# Patient Record
Sex: MALE | Race: OTHER | NOT HISPANIC OR LATINO
[De-identification: names, ages, dates, MRNs, and addresses within clinical notes are randomized per-mention and may not be internally consistent; named-entity substitution may affect disease eponyms.]

---

## 2019-05-06 PROBLEM — Z00.00 ENCOUNTER FOR PREVENTIVE HEALTH EXAMINATION: Status: ACTIVE | Noted: 2019-05-06

## 2019-05-07 ENCOUNTER — APPOINTMENT (OUTPATIENT)
Dept: OTOLARYNGOLOGY | Facility: CLINIC | Age: 39
End: 2019-05-07
Payer: COMMERCIAL

## 2019-05-07 VITALS
BODY MASS INDEX: 24.38 KG/M2 | HEART RATE: 87 BPM | SYSTOLIC BLOOD PRESSURE: 112 MMHG | WEIGHT: 190 LBS | HEIGHT: 74 IN | DIASTOLIC BLOOD PRESSURE: 78 MMHG

## 2019-05-07 DIAGNOSIS — Z86.39 PERSONAL HISTORY OF OTHER ENDOCRINE, NUTRITIONAL AND METABOLIC DISEASE: ICD-10-CM

## 2019-05-07 DIAGNOSIS — H93.292 OTHER ABNORMAL AUDITORY PERCEPTIONS, LEFT EAR: ICD-10-CM

## 2019-05-07 DIAGNOSIS — Z78.9 OTHER SPECIFIED HEALTH STATUS: ICD-10-CM

## 2019-05-07 DIAGNOSIS — H61.20 IMPACTED CERUMEN, UNSPECIFIED EAR: ICD-10-CM

## 2019-05-07 PROCEDURE — 69210 REMOVE IMPACTED EAR WAX UNI: CPT

## 2019-05-07 PROCEDURE — 99203 OFFICE O/P NEW LOW 30 MIN: CPT | Mod: 25

## 2019-05-07 RX ORDER — METFORMIN HYDROCHLORIDE 625 MG/1
TABLET ORAL
Refills: 0 | Status: ACTIVE | COMMUNITY

## 2019-05-07 NOTE — PHYSICAL EXAM
[FreeTextEntry1] : Microscopic ear exam with cerumen debridement:\par \par Right ear: Obstructing cerumen was debrided from the ear canal using suction, and curet.  The ear canal was otherwise within normal limits.  The tympanic membrane was intact and noninflamed.\par \par Left ear: Obstructing cerumen was debrided from the ear canal using suction, and curets.  The ear canal was otherwise within normal limits.  The tympanic membrane was intact and noninflamed.\par  [Midline] : trachea located in midline position [Normal] : no rashes

## 2019-05-07 NOTE — HISTORY OF PRESENT ILLNESS
[de-identified] : JUWAN ALEGRE has a history of intermittent left ear fullness and pain for the past several weeks.\par \par No prior history of infectious ear disease.\par No history of tinnitus.\par No history of vertigo.\par No history of exposure to loud noise or music.

## 2019-05-07 NOTE — ASSESSMENT
[FreeTextEntry1] : Ear hygiene reviewed in detail.  Follow up recommended if symptoms persist or progresses.  Routine follow up for cerumen management suggested.

## 2023-12-21 ENCOUNTER — TRANSCRIPTION ENCOUNTER (OUTPATIENT)
Age: 43
End: 2023-12-21

## 2023-12-22 ENCOUNTER — INPATIENT (INPATIENT)
Facility: HOSPITAL | Age: 43
LOS: 2 days | Discharge: HOME | DRG: 853 | End: 2023-12-25
Attending: SURGERY | Admitting: SURGERY
Payer: COMMERCIAL

## 2023-12-22 ENCOUNTER — TRANSCRIPTION ENCOUNTER (OUTPATIENT)
Age: 43
End: 2023-12-22

## 2023-12-22 VITALS
DIASTOLIC BLOOD PRESSURE: 92 MMHG | RESPIRATION RATE: 16 BRPM | HEIGHT: 74 IN | OXYGEN SATURATION: 100 % | SYSTOLIC BLOOD PRESSURE: 144 MMHG | HEART RATE: 122 BPM | TEMPERATURE: 98 F | WEIGHT: 190.04 LBS

## 2023-12-22 LAB
ALBUMIN SERPL ELPH-MCNC: 4.6 G/DL — SIGNIFICANT CHANGE UP (ref 3.3–5)
ALBUMIN SERPL ELPH-MCNC: 4.6 G/DL — SIGNIFICANT CHANGE UP (ref 3.3–5)
ALP SERPL-CCNC: 66 U/L — SIGNIFICANT CHANGE UP (ref 40–120)
ALP SERPL-CCNC: 66 U/L — SIGNIFICANT CHANGE UP (ref 40–120)
ALT FLD-CCNC: 11 U/L — SIGNIFICANT CHANGE UP (ref 10–45)
ALT FLD-CCNC: 11 U/L — SIGNIFICANT CHANGE UP (ref 10–45)
ANION GAP SERPL CALC-SCNC: 15 MMOL/L — SIGNIFICANT CHANGE UP (ref 5–17)
ANION GAP SERPL CALC-SCNC: 15 MMOL/L — SIGNIFICANT CHANGE UP (ref 5–17)
APPEARANCE UR: CLEAR — SIGNIFICANT CHANGE UP
APPEARANCE UR: CLEAR — SIGNIFICANT CHANGE UP
APTT BLD: 29.5 SEC — SIGNIFICANT CHANGE UP (ref 24.5–35.6)
APTT BLD: 29.5 SEC — SIGNIFICANT CHANGE UP (ref 24.5–35.6)
AST SERPL-CCNC: 14 U/L — SIGNIFICANT CHANGE UP (ref 10–40)
AST SERPL-CCNC: 14 U/L — SIGNIFICANT CHANGE UP (ref 10–40)
B-OH-BUTYR SERPL-SCNC: 2.7 MMOL/L — HIGH
B-OH-BUTYR SERPL-SCNC: 2.7 MMOL/L — HIGH
BASE EXCESS BLDA CALC-SCNC: -4.4 MMOL/L — LOW (ref -2–3)
BASE EXCESS BLDA CALC-SCNC: -4.4 MMOL/L — LOW (ref -2–3)
BASE EXCESS BLDA CALC-SCNC: -5.4 MMOL/L — LOW (ref -2–3)
BASE EXCESS BLDA CALC-SCNC: -5.4 MMOL/L — LOW (ref -2–3)
BASE EXCESS BLDA CALC-SCNC: -6.1 MMOL/L — LOW (ref -2–3)
BASE EXCESS BLDA CALC-SCNC: -6.1 MMOL/L — LOW (ref -2–3)
BILIRUB DIRECT SERPL-MCNC: 0.3 MG/DL — SIGNIFICANT CHANGE UP (ref 0–0.3)
BILIRUB DIRECT SERPL-MCNC: 0.3 MG/DL — SIGNIFICANT CHANGE UP (ref 0–0.3)
BILIRUB INDIRECT FLD-MCNC: 2 MG/DL — HIGH (ref 0.2–1)
BILIRUB INDIRECT FLD-MCNC: 2 MG/DL — HIGH (ref 0.2–1)
BILIRUB SERPL-MCNC: 2.3 MG/DL — HIGH (ref 0.2–1.2)
BILIRUB SERPL-MCNC: 2.3 MG/DL — HIGH (ref 0.2–1.2)
BILIRUB UR-MCNC: NEGATIVE — SIGNIFICANT CHANGE UP
BILIRUB UR-MCNC: NEGATIVE — SIGNIFICANT CHANGE UP
BLD GP AB SCN SERPL QL: NEGATIVE — SIGNIFICANT CHANGE UP
BLD GP AB SCN SERPL QL: NEGATIVE — SIGNIFICANT CHANGE UP
BUN SERPL-MCNC: 19 MG/DL — SIGNIFICANT CHANGE UP (ref 7–23)
BUN SERPL-MCNC: 19 MG/DL — SIGNIFICANT CHANGE UP (ref 7–23)
CA-I BLDA-SCNC: 1.12 MMOL/L — LOW (ref 1.15–1.33)
CA-I BLDA-SCNC: 1.12 MMOL/L — LOW (ref 1.15–1.33)
CA-I BLDA-SCNC: 1.14 MMOL/L — LOW (ref 1.15–1.33)
CA-I BLDA-SCNC: 1.14 MMOL/L — LOW (ref 1.15–1.33)
CALCIUM SERPL-MCNC: 10.1 MG/DL — SIGNIFICANT CHANGE UP (ref 8.4–10.5)
CALCIUM SERPL-MCNC: 10.1 MG/DL — SIGNIFICANT CHANGE UP (ref 8.4–10.5)
CHLORIDE SERPL-SCNC: 96 MMOL/L — SIGNIFICANT CHANGE UP (ref 96–108)
CHLORIDE SERPL-SCNC: 96 MMOL/L — SIGNIFICANT CHANGE UP (ref 96–108)
CO2 BLDA-SCNC: 20 MMOL/L — SIGNIFICANT CHANGE UP (ref 19–24)
CO2 BLDA-SCNC: 20 MMOL/L — SIGNIFICANT CHANGE UP (ref 19–24)
CO2 BLDA-SCNC: 21 MMOL/L — SIGNIFICANT CHANGE UP (ref 19–24)
CO2 BLDA-SCNC: 21 MMOL/L — SIGNIFICANT CHANGE UP (ref 19–24)
CO2 BLDA-SCNC: 22 MMOL/L — SIGNIFICANT CHANGE UP (ref 19–24)
CO2 BLDA-SCNC: 22 MMOL/L — SIGNIFICANT CHANGE UP (ref 19–24)
CO2 SERPL-SCNC: 23 MMOL/L — SIGNIFICANT CHANGE UP (ref 22–31)
CO2 SERPL-SCNC: 23 MMOL/L — SIGNIFICANT CHANGE UP (ref 22–31)
COHGB MFR BLDA: 1.3 % — SIGNIFICANT CHANGE UP
COHGB MFR BLDA: 1.3 % — SIGNIFICANT CHANGE UP
COHGB MFR BLDA: 1.7 % — SIGNIFICANT CHANGE UP
COHGB MFR BLDA: 1.7 % — SIGNIFICANT CHANGE UP
COLOR SPEC: YELLOW — SIGNIFICANT CHANGE UP
COLOR SPEC: YELLOW — SIGNIFICANT CHANGE UP
CREAT SERPL-MCNC: 0.93 MG/DL — SIGNIFICANT CHANGE UP (ref 0.5–1.3)
CREAT SERPL-MCNC: 0.93 MG/DL — SIGNIFICANT CHANGE UP (ref 0.5–1.3)
DIFF PNL FLD: ABNORMAL
DIFF PNL FLD: ABNORMAL
EGFR: 106 ML/MIN/1.73M2 — SIGNIFICANT CHANGE UP
EGFR: 106 ML/MIN/1.73M2 — SIGNIFICANT CHANGE UP
GAS PNL BLDA: SIGNIFICANT CHANGE UP
GLUCOSE BLDA-MCNC: 126 MG/DL — HIGH (ref 70–99)
GLUCOSE BLDA-MCNC: 126 MG/DL — HIGH (ref 70–99)
GLUCOSE BLDA-MCNC: 130 MG/DL — HIGH (ref 70–99)
GLUCOSE BLDA-MCNC: 130 MG/DL — HIGH (ref 70–99)
GLUCOSE BLDC GLUCOMTR-MCNC: 129 MG/DL — HIGH (ref 70–99)
GLUCOSE BLDC GLUCOMTR-MCNC: 129 MG/DL — HIGH (ref 70–99)
GLUCOSE BLDC GLUCOMTR-MCNC: 139 MG/DL — HIGH (ref 70–99)
GLUCOSE BLDC GLUCOMTR-MCNC: 139 MG/DL — HIGH (ref 70–99)
GLUCOSE SERPL-MCNC: 150 MG/DL — HIGH (ref 70–99)
GLUCOSE SERPL-MCNC: 150 MG/DL — HIGH (ref 70–99)
GLUCOSE UR QL: >=1000 MG/DL
GLUCOSE UR QL: >=1000 MG/DL
HCO3 BLDA-SCNC: 19 MMOL/L — LOW (ref 21–28)
HCO3 BLDA-SCNC: 19 MMOL/L — LOW (ref 21–28)
HCO3 BLDA-SCNC: 20 MMOL/L — LOW (ref 21–28)
HCO3 BLDA-SCNC: 20 MMOL/L — LOW (ref 21–28)
HCO3 BLDA-SCNC: 21 MMOL/L — SIGNIFICANT CHANGE UP (ref 21–28)
HCO3 BLDA-SCNC: 21 MMOL/L — SIGNIFICANT CHANGE UP (ref 21–28)
HCT VFR BLD CALC: 38.9 % — LOW (ref 39–50)
HCT VFR BLD CALC: 38.9 % — LOW (ref 39–50)
HCT VFR BLD CALC: 46 % — SIGNIFICANT CHANGE UP (ref 39–50)
HCT VFR BLD CALC: 46 % — SIGNIFICANT CHANGE UP (ref 39–50)
HGB BLD-MCNC: 12.9 G/DL — LOW (ref 13–17)
HGB BLD-MCNC: 12.9 G/DL — LOW (ref 13–17)
HGB BLD-MCNC: 15.4 G/DL — SIGNIFICANT CHANGE UP (ref 13–17)
HGB BLD-MCNC: 15.4 G/DL — SIGNIFICANT CHANGE UP (ref 13–17)
HGB BLDA-MCNC: 12.2 G/DL — LOW (ref 12.6–17.4)
HGB BLDA-MCNC: 12.2 G/DL — LOW (ref 12.6–17.4)
HGB BLDA-MCNC: 12.6 G/DL — SIGNIFICANT CHANGE UP (ref 12.6–17.4)
HGB BLDA-MCNC: 12.6 G/DL — SIGNIFICANT CHANGE UP (ref 12.6–17.4)
INR BLD: 1.27 — HIGH (ref 0.85–1.18)
INR BLD: 1.27 — HIGH (ref 0.85–1.18)
KETONES UR-MCNC: 80 MG/DL
KETONES UR-MCNC: 80 MG/DL
LACTATE SERPL-SCNC: 1.2 MMOL/L — SIGNIFICANT CHANGE UP (ref 0.5–2)
LACTATE SERPL-SCNC: 1.2 MMOL/L — SIGNIFICANT CHANGE UP (ref 0.5–2)
LEUKOCYTE ESTERASE UR-ACNC: NEGATIVE — SIGNIFICANT CHANGE UP
LEUKOCYTE ESTERASE UR-ACNC: NEGATIVE — SIGNIFICANT CHANGE UP
MCHC RBC-ENTMCNC: 27.2 PG — SIGNIFICANT CHANGE UP (ref 27–34)
MCHC RBC-ENTMCNC: 27.2 PG — SIGNIFICANT CHANGE UP (ref 27–34)
MCHC RBC-ENTMCNC: 27.5 PG — SIGNIFICANT CHANGE UP (ref 27–34)
MCHC RBC-ENTMCNC: 27.5 PG — SIGNIFICANT CHANGE UP (ref 27–34)
MCHC RBC-ENTMCNC: 33.2 GM/DL — SIGNIFICANT CHANGE UP (ref 32–36)
MCHC RBC-ENTMCNC: 33.2 GM/DL — SIGNIFICANT CHANGE UP (ref 32–36)
MCHC RBC-ENTMCNC: 33.5 GM/DL — SIGNIFICANT CHANGE UP (ref 32–36)
MCHC RBC-ENTMCNC: 33.5 GM/DL — SIGNIFICANT CHANGE UP (ref 32–36)
MCV RBC AUTO: 82.1 FL — SIGNIFICANT CHANGE UP (ref 80–100)
MCV RBC AUTO: 82.1 FL — SIGNIFICANT CHANGE UP (ref 80–100)
MCV RBC AUTO: 82.3 FL — SIGNIFICANT CHANGE UP (ref 80–100)
MCV RBC AUTO: 82.3 FL — SIGNIFICANT CHANGE UP (ref 80–100)
METHGB MFR BLDA: 0.6 % — SIGNIFICANT CHANGE UP
METHGB MFR BLDA: 0.6 % — SIGNIFICANT CHANGE UP
METHGB MFR BLDA: 0.7 % — SIGNIFICANT CHANGE UP
METHGB MFR BLDA: 0.7 % — SIGNIFICANT CHANGE UP
NITRITE UR-MCNC: NEGATIVE — SIGNIFICANT CHANGE UP
NITRITE UR-MCNC: NEGATIVE — SIGNIFICANT CHANGE UP
NRBC # BLD: 0 /100 WBCS — SIGNIFICANT CHANGE UP (ref 0–0)
O2 CT VFR BLDA CALC: 17 ML/DL — SIGNIFICANT CHANGE UP
O2 CT VFR BLDA CALC: 17 ML/DL — SIGNIFICANT CHANGE UP
O2 CT VFR BLDA CALC: 18 ML/DL — SIGNIFICANT CHANGE UP
O2 CT VFR BLDA CALC: 18 ML/DL — SIGNIFICANT CHANGE UP
OXYHGB MFR BLDA: 97.6 % — HIGH (ref 90–95)
OXYHGB MFR BLDA: 97.6 % — HIGH (ref 90–95)
OXYHGB MFR BLDA: 97.7 % — HIGH (ref 90–95)
OXYHGB MFR BLDA: 97.7 % — HIGH (ref 90–95)
PCO2 BLDA: 35 MMHG — SIGNIFICANT CHANGE UP (ref 35–48)
PCO2 BLDA: 35 MMHG — SIGNIFICANT CHANGE UP (ref 35–48)
PCO2 BLDA: 38 MMHG — SIGNIFICANT CHANGE UP (ref 35–48)
PCO2 BLDA: 38 MMHG — SIGNIFICANT CHANGE UP (ref 35–48)
PCO2 BLDA: 39 MMHG — SIGNIFICANT CHANGE UP (ref 35–48)
PCO2 BLDA: 39 MMHG — SIGNIFICANT CHANGE UP (ref 35–48)
PH BLDA: 7.33 — LOW (ref 7.35–7.45)
PH BLDA: 7.33 — LOW (ref 7.35–7.45)
PH BLDA: 7.34 — LOW (ref 7.35–7.45)
PH UR: 5.5 — SIGNIFICANT CHANGE UP (ref 5–8)
PH UR: 5.5 — SIGNIFICANT CHANGE UP (ref 5–8)
PLATELET # BLD AUTO: 168 K/UL — SIGNIFICANT CHANGE UP (ref 150–400)
PLATELET # BLD AUTO: 168 K/UL — SIGNIFICANT CHANGE UP (ref 150–400)
PLATELET # BLD AUTO: 215 K/UL — SIGNIFICANT CHANGE UP (ref 150–400)
PLATELET # BLD AUTO: 215 K/UL — SIGNIFICANT CHANGE UP (ref 150–400)
PO2 BLDA: 135 MMHG — HIGH (ref 83–108)
PO2 BLDA: 135 MMHG — HIGH (ref 83–108)
PO2 BLDA: 399 MMHG — HIGH (ref 83–108)
PO2 BLDA: 399 MMHG — HIGH (ref 83–108)
PO2 BLDA: 417 MMHG — HIGH (ref 83–108)
PO2 BLDA: 417 MMHG — HIGH (ref 83–108)
POTASSIUM BLDA-SCNC: 4 MMOL/L — SIGNIFICANT CHANGE UP (ref 3.5–5.1)
POTASSIUM BLDA-SCNC: 4 MMOL/L — SIGNIFICANT CHANGE UP (ref 3.5–5.1)
POTASSIUM BLDA-SCNC: 4.3 MMOL/L — SIGNIFICANT CHANGE UP (ref 3.5–5.1)
POTASSIUM BLDA-SCNC: 4.3 MMOL/L — SIGNIFICANT CHANGE UP (ref 3.5–5.1)
POTASSIUM SERPL-MCNC: 4.5 MMOL/L — SIGNIFICANT CHANGE UP (ref 3.5–5.3)
POTASSIUM SERPL-MCNC: 4.5 MMOL/L — SIGNIFICANT CHANGE UP (ref 3.5–5.3)
POTASSIUM SERPL-SCNC: 4.5 MMOL/L — SIGNIFICANT CHANGE UP (ref 3.5–5.3)
POTASSIUM SERPL-SCNC: 4.5 MMOL/L — SIGNIFICANT CHANGE UP (ref 3.5–5.3)
PROT SERPL-MCNC: 8.1 G/DL — SIGNIFICANT CHANGE UP (ref 6–8.3)
PROT SERPL-MCNC: 8.1 G/DL — SIGNIFICANT CHANGE UP (ref 6–8.3)
PROT UR-MCNC: NEGATIVE MG/DL — SIGNIFICANT CHANGE UP
PROT UR-MCNC: NEGATIVE MG/DL — SIGNIFICANT CHANGE UP
PROTHROM AB SERPL-ACNC: 14.4 SEC — HIGH (ref 9.5–13)
PROTHROM AB SERPL-ACNC: 14.4 SEC — HIGH (ref 9.5–13)
RBC # BLD: 4.74 M/UL — SIGNIFICANT CHANGE UP (ref 4.2–5.8)
RBC # BLD: 4.74 M/UL — SIGNIFICANT CHANGE UP (ref 4.2–5.8)
RBC # BLD: 5.59 M/UL — SIGNIFICANT CHANGE UP (ref 4.2–5.8)
RBC # BLD: 5.59 M/UL — SIGNIFICANT CHANGE UP (ref 4.2–5.8)
RBC # FLD: 13.4 % — SIGNIFICANT CHANGE UP (ref 10.3–14.5)
RBC # FLD: 13.4 % — SIGNIFICANT CHANGE UP (ref 10.3–14.5)
RBC # FLD: 13.6 % — SIGNIFICANT CHANGE UP (ref 10.3–14.5)
RBC # FLD: 13.6 % — SIGNIFICANT CHANGE UP (ref 10.3–14.5)
RH IG SCN BLD-IMP: POSITIVE — SIGNIFICANT CHANGE UP
SAO2 % BLDA: 100 % — HIGH (ref 94–98)
SAO2 % BLDA: 100 % — HIGH (ref 94–98)
SAO2 % BLDA: 98.8 % — HIGH (ref 94–98)
SAO2 % BLDA: 98.8 % — HIGH (ref 94–98)
SAO2 % BLDA: 99.5 % — HIGH (ref 94–98)
SAO2 % BLDA: 99.5 % — HIGH (ref 94–98)
SODIUM BLDA-SCNC: 132 MMOL/L — LOW (ref 136–145)
SODIUM SERPL-SCNC: 134 MMOL/L — LOW (ref 135–145)
SODIUM SERPL-SCNC: 134 MMOL/L — LOW (ref 135–145)
SP GR SPEC: 1.02 — SIGNIFICANT CHANGE UP (ref 1–1.03)
SP GR SPEC: 1.02 — SIGNIFICANT CHANGE UP (ref 1–1.03)
UROBILINOGEN FLD QL: 0.2 MG/DL — SIGNIFICANT CHANGE UP (ref 0.2–1)
UROBILINOGEN FLD QL: 0.2 MG/DL — SIGNIFICANT CHANGE UP (ref 0.2–1)
WBC # BLD: 14.96 K/UL — HIGH (ref 3.8–10.5)
WBC # BLD: 14.96 K/UL — HIGH (ref 3.8–10.5)
WBC # BLD: 15.16 K/UL — HIGH (ref 3.8–10.5)
WBC # BLD: 15.16 K/UL — HIGH (ref 3.8–10.5)
WBC # FLD AUTO: 14.96 K/UL — HIGH (ref 3.8–10.5)
WBC # FLD AUTO: 14.96 K/UL — HIGH (ref 3.8–10.5)
WBC # FLD AUTO: 15.16 K/UL — HIGH (ref 3.8–10.5)
WBC # FLD AUTO: 15.16 K/UL — HIGH (ref 3.8–10.5)

## 2023-12-22 PROCEDURE — 88304 TISSUE EXAM BY PATHOLOGIST: CPT | Mod: 26

## 2023-12-22 PROCEDURE — 99285 EMERGENCY DEPT VISIT HI MDM: CPT

## 2023-12-22 PROCEDURE — 76705 ECHO EXAM OF ABDOMEN: CPT | Mod: 26

## 2023-12-22 RX ORDER — HEPARIN SODIUM 5000 [USP'U]/ML
5000 INJECTION INTRAVENOUS; SUBCUTANEOUS EVERY 8 HOURS
Refills: 0 | Status: DISCONTINUED | OUTPATIENT
Start: 2023-12-22 | End: 2023-12-22

## 2023-12-22 RX ORDER — PIPERACILLIN AND TAZOBACTAM 4; .5 G/20ML; G/20ML
3.38 INJECTION, POWDER, LYOPHILIZED, FOR SOLUTION INTRAVENOUS ONCE
Refills: 0 | Status: COMPLETED | OUTPATIENT
Start: 2023-12-22 | End: 2023-12-22

## 2023-12-22 RX ORDER — ONDANSETRON 8 MG/1
4 TABLET, FILM COATED ORAL EVERY 6 HOURS
Refills: 0 | Status: DISCONTINUED | OUTPATIENT
Start: 2023-12-22 | End: 2023-12-25

## 2023-12-22 RX ORDER — DEXTROSE 50 % IN WATER 50 %
50 SYRINGE (ML) INTRAVENOUS
Refills: 0 | Status: DISCONTINUED | OUTPATIENT
Start: 2023-12-22 | End: 2023-12-25

## 2023-12-22 RX ORDER — INSULIN HUMAN 100 [IU]/ML
2 INJECTION, SOLUTION SUBCUTANEOUS
Qty: 100 | Refills: 0 | Status: DISCONTINUED | OUTPATIENT
Start: 2023-12-22 | End: 2023-12-23

## 2023-12-22 RX ORDER — PIPERACILLIN AND TAZOBACTAM 4; .5 G/20ML; G/20ML
3.38 INJECTION, POWDER, LYOPHILIZED, FOR SOLUTION INTRAVENOUS EVERY 8 HOURS
Refills: 0 | Status: DISCONTINUED | OUTPATIENT
Start: 2023-12-23 | End: 2023-12-25

## 2023-12-22 RX ORDER — MORPHINE SULFATE 50 MG/1
4 CAPSULE, EXTENDED RELEASE ORAL ONCE
Refills: 0 | Status: DISCONTINUED | OUTPATIENT
Start: 2023-12-22 | End: 2023-12-22

## 2023-12-22 RX ORDER — CANAGLIFLOZIN 100 MG/1
0 TABLET, FILM COATED ORAL
Refills: 0 | DISCHARGE

## 2023-12-22 RX ORDER — CEFTRIAXONE 500 MG/1
1000 INJECTION, POWDER, FOR SOLUTION INTRAMUSCULAR; INTRAVENOUS ONCE
Refills: 0 | Status: COMPLETED | OUTPATIENT
Start: 2023-12-22 | End: 2023-12-22

## 2023-12-22 RX ORDER — SODIUM CHLORIDE 9 MG/ML
1000 INJECTION, SOLUTION INTRAVENOUS
Refills: 0 | Status: DISCONTINUED | OUTPATIENT
Start: 2023-12-22 | End: 2023-12-23

## 2023-12-22 RX ORDER — PIPERACILLIN AND TAZOBACTAM 4; .5 G/20ML; G/20ML
3.38 INJECTION, POWDER, LYOPHILIZED, FOR SOLUTION INTRAVENOUS ONCE
Refills: 0 | Status: COMPLETED | OUTPATIENT
Start: 2023-12-23 | End: 2023-12-23

## 2023-12-22 RX ORDER — SODIUM CHLORIDE 9 MG/ML
1000 INJECTION INTRAMUSCULAR; INTRAVENOUS; SUBCUTANEOUS ONCE
Refills: 0 | Status: COMPLETED | OUTPATIENT
Start: 2023-12-22 | End: 2023-12-22

## 2023-12-22 RX ORDER — SODIUM CHLORIDE 9 MG/ML
1000 INJECTION, SOLUTION INTRAVENOUS
Refills: 0 | Status: DISCONTINUED | OUTPATIENT
Start: 2023-12-22 | End: 2023-12-22

## 2023-12-22 RX ORDER — METRONIDAZOLE 500 MG
500 TABLET ORAL ONCE
Refills: 0 | Status: COMPLETED | OUTPATIENT
Start: 2023-12-22 | End: 2023-12-22

## 2023-12-22 RX ORDER — HYDROMORPHONE HYDROCHLORIDE 2 MG/ML
0.25 INJECTION INTRAMUSCULAR; INTRAVENOUS; SUBCUTANEOUS EVERY 6 HOURS
Refills: 0 | Status: DISCONTINUED | OUTPATIENT
Start: 2023-12-22 | End: 2023-12-23

## 2023-12-22 RX ORDER — METFORMIN HYDROCHLORIDE 850 MG/1
1 TABLET ORAL
Refills: 0 | DISCHARGE

## 2023-12-22 RX ORDER — CHLORHEXIDINE GLUCONATE 213 G/1000ML
1 SOLUTION TOPICAL
Refills: 0 | Status: DISCONTINUED | OUTPATIENT
Start: 2023-12-22 | End: 2023-12-25

## 2023-12-22 RX ADMIN — PIPERACILLIN AND TAZOBACTAM 200 GRAM(S): 4; .5 INJECTION, POWDER, LYOPHILIZED, FOR SOLUTION INTRAVENOUS at 23:59

## 2023-12-22 RX ADMIN — MORPHINE SULFATE 4 MILLIGRAM(S): 50 CAPSULE, EXTENDED RELEASE ORAL at 18:41

## 2023-12-22 RX ADMIN — HYDROMORPHONE HYDROCHLORIDE 0.25 MILLIGRAM(S): 2 INJECTION INTRAMUSCULAR; INTRAVENOUS; SUBCUTANEOUS at 18:39

## 2023-12-22 RX ADMIN — SODIUM CHLORIDE 1000 MILLILITER(S): 9 INJECTION INTRAMUSCULAR; INTRAVENOUS; SUBCUTANEOUS at 14:40

## 2023-12-22 RX ADMIN — Medication 100 MILLIGRAM(S): at 17:23

## 2023-12-22 RX ADMIN — HYDROMORPHONE HYDROCHLORIDE 0.25 MILLIGRAM(S): 2 INJECTION INTRAMUSCULAR; INTRAVENOUS; SUBCUTANEOUS at 23:59

## 2023-12-22 RX ADMIN — MORPHINE SULFATE 4 MILLIGRAM(S): 50 CAPSULE, EXTENDED RELEASE ORAL at 15:17

## 2023-12-22 RX ADMIN — CEFTRIAXONE 100 MILLIGRAM(S): 500 INJECTION, POWDER, FOR SOLUTION INTRAMUSCULAR; INTRAVENOUS at 16:36

## 2023-12-22 RX ADMIN — MORPHINE SULFATE 4 MILLIGRAM(S): 50 CAPSULE, EXTENDED RELEASE ORAL at 16:35

## 2023-12-22 NOTE — H&P ADULT - NSHPADDITIONALINFOADULT_GEN_ALL_CORE
****Senior Surgical Resident Addendum****    Agree w A/P. Briefly, 43M T2DM (Metformin, Invokana - last dose today), referred to ER for evaluation of RUQ pain and concern for acute cholecystitis. Describes 48hrs stabbing RUQ pain w radiation to back, ROS otherwise negative. VS w/nl, abd soft, RUQ TTP w/o R/G, + Morrow's. Labs WBC 15, Tbili 2.3, (D .3), BHB 2.3, AG 15, RUQ U/S + sludge w thickened GB wall, + [facility], +[place]. NPO/IVF, IV ABX, discussed role of surgery, pt willing to proceed with operative intervention. Given recent SGLT2-I will require attentive post op monitoring for eDKA. Attending surgeon + anesthesiologist aware and agreeable w A/P. ****Senior Surgical Resident Addendum****    Agree w A/P. Briefly, 43M T2DM (Metformin, Invokana - last dose today), referred to ER for evaluation of RUQ pain and concern for acute cholecystitis. Describes 48hrs stabbing RUQ pain w radiation to back, ROS otherwise negative. VS w/nl, abd soft, RUQ TTP w/o R/G, + Morrow's. Labs WBC 15, Tbili 2.3, (D .3), BHB 2.3, AG 15, RUQ U/S + sludge w thickened GB wall, + [facility], +[place]. NPO/IVF, IV ABX, discussed role of surgery, pt willing to proceed with operative intervention. Given recent SGLT2-I will require attentive post op monitoring for eDKA. Attending surgeon + anesthesiologist aware and agreeable w A/P.

## 2023-12-22 NOTE — H&P ADULT - ASSESSMENT
43M with PMHx of DMII and no PSHx who presents to [facility] for evaluation of epigsatric and RUQ abdominal pain. Patient reports pain started Wednesday evening at 11PM without clear inciting factors. Patient tachycardic, but HD stable on presentation. RUQ U/S revealed XX. Given duration of pain, favor acute cholecytsitis over biliary colic.    Admit to regional, Dr. Dover  NPO/IVF  Cefitraxone/Flagyl  Add on for robotic choelcytesctomy   43M with PMHx of DMII and no PSHx who presents to [facility] for evaluation of epigsatric and RUQ abdominal pain. Patient reports pain started Wednesday evening at 11PM without clear inciting factors. Patient tachycardic, but HD stable on presentation. RUQ U/S revealed XX. Given duration of pain, favor acute cholecytsitis over biliary colic.    Admit to regional, Dr. Dover  NPO/IVF  Cefitraxone/Flagyl  Add on for robotic choelcytesctomy

## 2023-12-22 NOTE — ED PROVIDER NOTE - OBJECTIVE STATEMENT
43-year-old male history of type 2 diabetes complaining of right upper quadrant pain.  Patient states he started having progressively worse epigastric pain radiating to his back 12/20 at around 11 PM, went to [facility] at around 3 AM and had studies that showed a right upper quadrant ultrasound w fatty liver and a CAT scan of his abdomen showing a mildly distended gallbladder with a hazy appearance of the gallbladder wall without pericholecystic fluid, biliary tree dilation, CBD stone suspicious for cholecystitis.  White count was 10.9, LFTs were normal, chemistries were normal except for glucose of 241, lipase was normal. 43-year-old male history of type 2 diabetes complaining of right upper quadrant pain.  Patient states he started having progressively worse epigastric pain radiating to his back 12/20 at around 11 PM, went to [facility] at around 3 AM and had studies that showed a right upper quadrant ultrasound w fatty liver and a CAT scan of his abdomen showing a mildly distended gallbladder with a hazy appearance of the gallbladder wall without pericholecystic fluid, biliary tree dilation, CBD stone suspicious for cholecystitis.  White count was 10.9, LFTs were normal, chemistries were normal except for glucose of 241, lipase was normal.Patient went to see Dr. Fernandez felipe as an outpatient and was sent to the ER because of right upper quadrant tenderness and concern for cholecystitis.  Patient reports the pain is change location from the epigastric area to the right upper quadrant since last night.  No nausea or vomiting, fever, chest pain, shortness of breath, dysuria, flank pain, hematuria.  Patient reports he has not had anything to eat or drink since around 11 PM last night in case he needed to have surgery.  Patient rates his pain as 5 out of 10. 43-year-old male history of type 2 diabetes complaining of right upper quadrant pain.  Patient states he started having progressively worse epigastric pain radiating to his back 12/20 at around 11 PM, went to [facility] at around 3 AM and had studies that showed a right upper quadrant ultrasound w fatty liver and a CAT scan of his abdomen showing a mildly distended gallbladder with a hazy appearance of the gallbladder wall without pericholecystic fluid, biliary tree dilation, CBD stone suspicious for cholecystitis.  White count was 10.9, LFTs were normal, chemistries were normal except for glucose of 241, lipase was normal.Patient went to see Dr. Fernandez felipe as an outpatient and was sent to the ER because of right upper quadrant tenderness and concern for cholecystitis.  Patient reports the pain is change location from the epigastric area to the right upper quadrant since last night.  No nausea or vomiting, fever, chest pain, shortness of breath, dysuria, flank pain, hematuria.  Patient reports he has not had anything to eat or drink since around 11 PM last night in case he needed to have surgery.  Patient rates his pain as 5 out of 10.  Pt reports he's been taking amoxicillin since his ED visit to [facility]. 43-year-old male history of type 2 diabetes complaining of right upper quadrant pain.  Patient states he started having progressively worse epigastric pain radiating to his back 12/20 at around 11 PM, went to [facility] at around 3 AM and had studies that showed a right upper quadrant ultrasound w fatty liver and a CAT scan of his abdomen showing a mildly distended gallbladder with a hazy appearance of the gallbladder wall without pericholecystic fluid, biliary tree dilation, CBD stone suspicious for cholecystitis.  White count was 10.9, LFTs were normal, chemistries were normal except for glucose of 241, lipase was normal.Patient went to see Dr. Fernandez felipe as an outpatient and was sent to the ER because of right upper quadrant tenderness and concern for cholecystitis.  Patient reports the pain is change location from the epigastric area to the right upper quadrant since last night.  No nausea or vomiting, fever, chest pain, shortness of breath, dysuria, flank pain, hematuria.  Patient reports he has not had anything to eat or drink since around 11 PM last night in case he needed to have surgery.  Patient rates his pain as 5 out of 10.  Pt reports he's been taking amoxicillin since his ED visit to [facility].

## 2023-12-22 NOTE — ED PROVIDER NOTE - PROGRESS NOTE DETAILS
U/s w findings c/w cholecystitis per tech's notes; tech read shared w surg who discussed w Dr Fisher - tba to Dr Fisher, add on for surg today; surg request beta hydroxybutyrate 2/2 risk for euvolemic dka w invokana (ag wnl).  Abx ordered.

## 2023-12-22 NOTE — H&P ADULT - NSHPLABSRESULTS_GEN_ALL_CORE
CBC Full  -  ( 22 Dec 2023 14:42 )  WBC Count : 14.96 K/uL  RBC Count : 5.59 M/uL  Hemoglobin : 15.4 g/dL  Hematocrit : 46.0 %  Platelet Count - Automated : 215 K/uL  Mean Cell Volume : 82.3 fl  Mean Cell Hemoglobin : 27.5 pg  Mean Cell Hemoglobin Concentration : 33.5 gm/dL  Auto Neutrophil # : x  Auto Lymphocyte # : x  Auto Monocyte # : x  Auto Eosinophil # : x  Auto Basophil # : x  Auto Neutrophil % : x  Auto Lymphocyte % : x  Auto Monocyte % : x  Auto Eosinophil % : x  Auto Basophil % : x    12-22    134<L>  |  96  |  19  ----------------------------<  150<H>  4.5   |  23  |  0.93    Ca    10.1      22 Dec 2023 14:42    TPro  8.1  /  Alb  4.6  /  TBili  2.3<H>  /  DBili  0.3  /  AST  14  /  ALT  11  /  AlkPhos  66  12-22    LIVER FUNCTIONS - ( 22 Dec 2023 14:42 )  Alb: 4.6 g/dL / Pro: 8.1 g/dL / ALK PHOS: 66 U/L / ALT: 11 U/L / AST: 14 U/L / GGT: x             PT/INR - ( 22 Dec 2023 14:42 )   PT: 14.4 sec;   INR: 1.27          PTT - ( 22 Dec 2023 14:42 )  PTT:29.5 sec    Urinalysis Basic - ( 22 Dec 2023 14:42 )    Color: x / Appearance: x / SG: x / pH: x  Gluc: 150 mg/dL / Ketone: x  / Bili: x / Urobili: x   Blood: x / Protein: x / Nitrite: x   Leuk Esterase: x / RBC: x / WBC x   Sq Epi: x / Non Sq Epi: x / Bacteria: x      CAPILLARY BLOOD GLUCOSE

## 2023-12-22 NOTE — BRIEF OPERATIVE NOTE - NSICDXBRIEFPROCEDURE_GEN_ALL_CORE_FT
PROCEDURES:  Robot-assisted laparoscopy with cholecystectomy 22-Dec-2023 22:39:17  Ismael Witt  Repair, hernia, umbilical, with lipectomy 22-Dec-2023 22:40:28  Ismael Witt

## 2023-12-22 NOTE — CONSULT NOTE ADULT - ASSESSMENT
43M with past medical history of Diabetes type II, no past surgical history with acute cholecystitis now s/p RA cholecystectomy. Admitted to SICU for monitoring in the setting of concern for euglycemic DKA 2/2 SGLT-2 inhibitor.       Neuro: A&Ox3. Pain control with Tylenol and Dilaudid. Nausea control with zofran PRN  HEENT: No concerns  CV: Goal MAP > 65.   Pulm: Sating well on nasal canula   GI: CLD. D5LR.  : Quinn. Strict I and O.   Endo: Hx: DM type II taking Canagliflozin. Last dose 12/21. BHB 2.7 on admission. Anion Gap 15. Concern for Euglycemic DKA. D5LR with Insulin drip post-op.   ID: Zosyn (12/22--)   Heme/PPx: Hep SQ POD 1   PT/OT: Deferred  Dispo: SICU   43M with past medical history of Diabetes type II, no past surgical history with acute cholecystitis now s/p RA cholecystectomy, lysis of adhesions and umbilical hernia repair. Admitted to SICU for monitoring in the setting of concern for euglycemic DKA 2/2 SGLT-2 inhibitor.       Neuro: A&Ox3. Pain control with Tylenol and Dilaudid. Nausea control with zofran PRN  HEENT: No concerns  CV: Goal MAP > 65.   Pulm: Sating well on nasal canula   GI: CLD. D5NS.  : Quinn. Strict I and O.   Endo: Hx: DM type II taking Canagliflozin. Last dose 12/21. BHB 2.7 on admission. Anion Gap 15. Concern for Euglycemic DKA. D5NS with Insulin drip post-op.   ID: Zosyn (12/22--)   Heme/PPx: Hep SQ POD 1   PT/OT: Deferred  Dispo: SICU   43M with past medical history of Diabetes type II, no past surgical history with acute cholecystitis now s/p RA cholecystectomy, lysis of adhesions and umbilical hernia repair. Admitted to SICU for monitoring in the setting of concern for euglycemic DKA 2/2 SGLT-2 inhibitor.       Neuro: A&Ox3. Pain control with Tylenol and Dilaudid. Nausea control with zofran PRN  HEENT: No concerns  CV: Goal MAP > 65.   Pulm: Sating well on nasal canula   GI: CLD. D5NS.  : Quinn. Strict I and O.   Endo: Hx: DM type II taking Canagliflozin. Last dose 12/21. BHB 2.7 on admission. Anion Gap 15. Concern for Euglycemic DKA. D5NS with Insulin drip post-op. Holding Metformin   ID: Zosyn (12/22--)   Heme/PPx: Hep SQ POD 1   PT/OT: Deferred  Dispo: SICU

## 2023-12-22 NOTE — BRIEF OPERATIVE NOTE - OPERATION/FINDINGS
Patient prepped and draped in sterile fashion, Suprabumbilical semilunar incision for entry, umbilical hernia identified and reduced. Entry with 10mm baloon trocar. Rest of trocars placed under direct supervision. Pt placed in reverse Trendelenburg w/ right side up. Upon entry, extensive adhesions encountered in the RUQ. Gallbladder indentified, appearead grangrenous, with 50cc pus surrounding it. Gallbladder drained with needle. Fluid sent for culture. Omental attachments to GB were gently swept away. GB fundus retracted superiorly over dome of liver. Filmy adhesions between the GB & omentum/duo cauterized. GB infundibulum retracted laterally towards RLQ exposing Calot’s triangle. Critical view of safety obtained. Cystic duct and artery clipped and divided. Peritoneal attachments btw GB & liver bed  w/ electrocautery. Hemostasis achieved. No leakage of bile from cystic duct stump. Fascia and skin closed in usual fashion.

## 2023-12-22 NOTE — BRIEF OPERATIVE NOTE - NSICDXBRIEFPOSTOP_GEN_ALL_CORE_FT
POST-OP DIAGNOSIS:  Umbilical hernia 22-Dec-2023 22:39:40  Ismael Witt  S/P laparoscopy with lysis of adhesions 22-Dec-2023 22:39:53  Ismael Witt  Gangrenous cholecystitis 22-Dec-2023 22:40:04  Ismael Witt  Gallbladder abscess 22-Dec-2023 22:40:13  Ismael Witt

## 2023-12-22 NOTE — ED ADULT TRIAGE NOTE - CHIEF COMPLAINT QUOTE
"I was sent here and told the surgical residents would be waiting for me on arrival to evaluate me for a possible cholecystectomy." C/o RUQ pain, worse after eating x2 days. Was placed on PO abx for elevated WBC. Has been NPO since midnight, "that's probably why my HR is elevated, I'm dehydrated."  on EKG in triage.

## 2023-12-22 NOTE — CONSULT NOTE ADULT - NS ATTEND AMEND GEN_ALL_CORE FT
43 y type 2 DM on canagliflozin s/o urgent cholecystectomy found to have elevated b hydroxybutyrate.  Concern for elev 43 y type 2 DM on canagliflozin s/o urgent cholecystectomy found to have elevated b hydroxybutyrate.  Concern for elevated BHB, and EDKA, and started on insulin infusion at 2 u /h.  AG elevated only to 15, now 11. Baseline A1C appx 8 as per pt.  Plan  recheck electrolyted, AG.  Will consider consult w endocrine regarding protocol for transitioning from IV infusion, (timing and need for lantus/NPH) as pt type 2 diabetic, not on insulin and halfvlife canagliflozin 10 to 13 h. DKA seems to have resolved despite elevate BHB, but will continue iv insulin/dextrose ON

## 2023-12-22 NOTE — ED ADULT NURSE NOTE - OBJECTIVE STATEMENT
43M, hx DM, presents with RUQ pain x 2 days. Reports pain has progressively been worsening initially starting starting around epigastric area, now raidaiting to RUQ and back. Evaluated at [facility] yesterday where given rx of Amoxicillin. Severe tenderness RUQ. Denies fevers, chills, cp, sob, nausea, vomiting, diarrhea, or other urinary s/s. Pt well-appearing, ambulating steadily, accompanied by wife

## 2023-12-22 NOTE — H&P ADULT - HISTORY OF PRESENT ILLNESS
Patient is a 43M with PMHx of DMII and no PSHx who presents to [facility] for evaluation of epigsatric and RUQ abdominal pain. Patient reports pain started Wednesday evening at 11PM without clear inciting factors. States pain was a sharp, punching pain in epigastrium that radiates to the back. Denied any additional symptoms including fevers, chills, chest pain, SOB, nausea, emesis. Reports similar pain to this unrelated to PO intake that self resolves within a few minutes, but states pain was not improving so he wanted to [facility] for evaluation. Patient underwent U/S, CT at [facility] which revealed non-dilated gallbladder without cholelithiasis and CT showed mild GBW without other acute pathology and he was discharged on Augmentin.   Patient believed pain may have been gastritis and was evaluated by outpatient GI this AM who sent him to [facility] for evaluation of cholecystitis. Patient reports pain is similar and currently 5/10, but now in the RUQ as well and still radiating to the back.    Medical History: DMII  Surgical History: Denies  Medications: Metformin 1000 BID, Invokana daily  Allergies: Denies  Social History: Denies tobacco use. Admits to social alcohol use. 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    In the ED, patient afebrile, nontoxic appearing:  - VITALS: Afebrile 98.2 F,  - sinus, /92, saturating well on RA  - LABORAORY: WBC 14.9K, Hb 15.4, Tbili 2.3 - Direct 0.3  - RUQ U/S with thickening GB wall, pericholecystic fluid and +Morrow's sign   Patient is a 43M with PMHx of DMII and no PSHx who presents to [facility] for evaluation of epigsatric and RUQ abdominal pain. Patient reports pain started Wednesday evening at 11PM without clear inciting factors. States pain was a sharp, punching pain in epigastrium that radiates to the back. Denied any additional symptoms including fevers, chills, chest pain, SOB, nausea, emesis. Reports similar pain to this unrelated to PO intake that self resolves within a few minutes, but states pain was not improving so he wanted to [facility] for evaluation. Patient underwent U/S, CT at [facility] which revealed non-dilated gallbladder without cholelithiasis and CT showed mild GBW without other acute pathology and he was discharged on Augmentin.   Patient believed pain may have been gastritis and was evaluated by outpatient GI this AM who sent him to [facility] for evaluation of cholecystitis. Patient reports pain is similar and currently 5/10, but now in the RUQ as well and still radiating to the back.    Medical History: DMII  Surgical History: Denies  Medications: Metformin 1000 BID, Invokana daily  Allergies: Denies  Social History: Denies tobacco use. Admits to social alcohol use. 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    In the ED, patient afebrile, nontoxic appearing:  - VITALS: Afebrile 98.2 F,  - sinus, /92, saturating well on RA  - LABORAORY: WBC 14.9K, Hb 15.4, Tbili 2.3 - Direct 0.3  - RUQ U/S with thickening GB wall, pericholecystic fluid and +Morrow's sign

## 2023-12-22 NOTE — CONSULT NOTE ADULT - SUBJECTIVE AND OBJECTIVE BOX
HPI:  Patient is a 43M with PMHx of DMII and no PSHx who presents to [facility] for evaluation of epigsatric and RUQ abdominal pain. Patient reports pain started Wednesday evening at 11PM without clear inciting factors. States pain was a sharp, punching pain in epigastrium that radiates to the back. Denied any additional symptoms including fevers, chills, chest pain, SOB, nausea, emesis. Reports similar pain to this unrelated to PO intake that self resolves within a few minutes, but states pain was not improving so he wanted to [facility] for evaluation. Patient underwent U/S, CT at [facility] which revealed non-dilated gallbladder without cholelithiasis and CT showed mild GBW without other acute pathology and he was discharged on Augmentin.   Patient believed pain may have been gastritis and was evaluated by outpatient GI this AM who sent him to [facility] for evaluation of cholecystitis. Patient reports pain is similar and currently 5/10, but now in the RUQ as well and still radiating to the back.    Medical History: DMII  Surgical History: Denies  Medications: Metformin 1000 BID, Invokana daily  Allergies: Denies  Social History: Denies tobacco use. Admits to social alcohol use. 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    In the ED, patient afebrile, nontoxic appearing:  - VITALS: Afebrile 98.2 F,  - sinus, /92, saturating well on RA  - LABORAORY: WBC 14.9K, Hb 15.4, Tbili 2.3 - Direct 0.3  - RUQ U/S with thickening GB wall, pericholecystic fluid and +Morrow's sign   (22 Dec 2023 16:21)      PAST MEDICAL & SURGICAL HISTORY:  Diabetes          ROS: See HPI    MEDICATIONS  (STANDING):  lactated ringers. 1000 milliLiter(s) (100 mL/Hr) IV Continuous <Continuous>  piperacillin/tazobactam IVPB. 3.375 Gram(s) IV Intermittent once    MEDICATIONS  (PRN):  HYDROmorphone  Injectable 0.25 milliGRAM(s) IV Push every 6 hours PRN Severe Pain (7 - 10)  ondansetron Injectable 4 milliGRAM(s) IV Push every 6 hours PRN Nausea      Allergies    No Known Allergies    Intolerances        SOCIAL HISTORY:  Smoke: Never Smoker  EtOH: occasional    FAMILY HISTORY:      Vital Signs Last 24 Hrs  T(C): 37.3 (22 Dec 2023 19:34), Max: 37.3 (22 Dec 2023 16:01)  T(F): 99.1 (22 Dec 2023 16:55), Max: 99.1 (22 Dec 2023 16:01)  HR: 108 (22 Dec 2023 19:34) (108 - 122)  BP: 127/89 (22 Dec 2023 19:34) (127/79 - 144/92)  BP(mean): --  RR: 16 (22 Dec 2023 19:34) (16 - 16)  SpO2: 100% (22 Dec 2023 19:34) (100% - 100%)    Parameters below as of 22 Dec 2023 16:01  Patient On (Oxygen Delivery Method): room air        PHYSICAL EXAM    Gen: NAD   Neuro: A&oX3 no neuro deficits  HEENT:   CV: RRR reg s1s2 no M  Pulm: CTA B/L no w/w/r  Abd: Soft, NT/ND  Ext: No C/C/E  Skin: No rashes erythema or ecchymosis  MSK: No joint swelling noted  Psych: Normal affect     LABS:                        15.4   14.96 )-----------( 215      ( 22 Dec 2023 14:42 )             46.0     12-22    134<L>  |  96  |  19  ----------------------------<  150<H>  4.5   |  23  |  0.93    Ca    10.1      22 Dec 2023 14:42    TPro  8.1  /  Alb  4.6  /  TBili  2.3<H>  /  DBili  0.3  /  AST  14  /  ALT  11  /  AlkPhos  66  12-22    PT/INR - ( 22 Dec 2023 14:42 )   PT: 14.4 sec;   INR: 1.27          PTT - ( 22 Dec 2023 14:42 )  PTT:29.5 sec  Urinalysis Basic - ( 22 Dec 2023 14:42 )    Color: x / Appearance: x / SG: x / pH: x  Gluc: 150 mg/dL / Ketone: x  / Bili: x / Urobili: x   Blood: x / Protein: x / Nitrite: x   Leuk Esterase: x / RBC: x / WBC x   Sq Epi: x / Non Sq Epi: x / Bacteria: x        RADIOLOGY & ADDITIONAL STUDIES:    Assessment and Plan:  43yMale HPI:  Patient is a 43M with PMHx of DMII and no PSHx who presents to [facility] for evaluation of epigsatric and RUQ abdominal pain. Patient reports pain started Wednesday evening at 11PM without clear inciting factors. States pain was a sharp, punching pain in epigastrium that radiates to the back. Denied any additional symptoms including fevers, chills, chest pain, SOB, nausea, emesis. Reports similar pain to this unrelated to PO intake that self resolves within a few minutes, but states pain was not improving so he wanted to [facility] for evaluation. Patient underwent U/S, CT at [facility] which revealed non-dilated gallbladder without cholelithiasis and CT showed mild GBW without other acute pathology and he was discharged on Augmentin.   Patient believed pain may have been gastritis and was evaluated by outpatient GI this AM who sent him to [facility] for evaluation of cholecystitis. Patient reports pain is similar and currently 5/10, but now in the RUQ as well and still radiating to the back.    Medical History: DMII  Surgical History: Denies  Medications: Metformin 1000 BID, Invokana daily  Allergies: Denies  Social History: Denies tobacco use. Admits to social alcohol use. 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    In the ED, patient afebrile, nontoxic appearing:  - VITALS: Afebrile 98.2 F,  - sinus, /92, saturating well on RA  - LABORAORY: WBC 14.9K, Hb 15.4, Tbili 2.3 - Direct 0.3  - RUQ U/S with thickening GB wall, pericholecystic fluid and +Morrow's sign   (22 Dec 2023 16:21)      PAST MEDICAL & SURGICAL HISTORY:  Diabetes          ROS: See HPI    MEDICATIONS  (STANDING):  lactated ringers. 1000 milliLiter(s) (100 mL/Hr) IV Continuous <Continuous>  piperacillin/tazobactam IVPB. 3.375 Gram(s) IV Intermittent once    MEDICATIONS  (PRN):  HYDROmorphone  Injectable 0.25 milliGRAM(s) IV Push every 6 hours PRN Severe Pain (7 - 10)  ondansetron Injectable 4 milliGRAM(s) IV Push every 6 hours PRN Nausea      Allergies    No Known Allergies    Intolerances        SOCIAL HISTORY:  Smoke: Never Smoker  EtOH: occasional    FAMILY HISTORY:      Vital Signs Last 24 Hrs  T(C): 37.3 (22 Dec 2023 19:34), Max: 37.3 (22 Dec 2023 16:01)  T(F): 99.1 (22 Dec 2023 16:55), Max: 99.1 (22 Dec 2023 16:01)  HR: 108 (22 Dec 2023 19:34) (108 - 122)  BP: 127/89 (22 Dec 2023 19:34) (127/79 - 144/92)  BP(mean): --  RR: 16 (22 Dec 2023 19:34) (16 - 16)  SpO2: 100% (22 Dec 2023 19:34) (100% - 100%)    Parameters below as of 22 Dec 2023 16:01  Patient On (Oxygen Delivery Method): room air        PHYSICAL EXAM    Gen: NAD   Neuro: A&oX3 no neuro deficits  HEENT:   CV: RRR reg s1s2 no M  Pulm: CTA B/L no w/w/r  Abd: Soft, NT/ND  Ext: No C/C/E  Skin: No rashes erythema or ecchymosis  MSK: No joint swelling noted  Psych: Normal affect     LABS:                        15.4   14.96 )-----------( 215      ( 22 Dec 2023 14:42 )             46.0     12-22    134<L>  |  96  |  19  ----------------------------<  150<H>  4.5   |  23  |  0.93    Ca    10.1      22 Dec 2023 14:42    TPro  8.1  /  Alb  4.6  /  TBili  2.3<H>  /  DBili  0.3  /  AST  14  /  ALT  11  /  AlkPhos  66  12-22    PT/INR - ( 22 Dec 2023 14:42 )   PT: 14.4 sec;   INR: 1.27          PTT - ( 22 Dec 2023 14:42 )  PTT:29.5 sec  Urinalysis Basic - ( 22 Dec 2023 14:42 )    Color: x / Appearance: x / SG: x / pH: x  Gluc: 150 mg/dL / Ketone: x  / Bili: x / Urobili: x   Blood: x / Protein: x / Nitrite: x   Leuk Esterase: x / RBC: x / WBC x   Sq Epi: x / Non Sq Epi: x / Bacteria: x        RADIOLOGY & ADDITIONAL STUDIES:    Assessment and Plan:  43yMale HPI:  Patient is a 43M with PMHx of DMII and no PSHx who presents to [facility] for evaluation of epigsatric and RUQ abdominal pain. Patient reports pain started Wednesday evening at 11PM without clear inciting factors. States pain was a sharp, punching pain in epigastrium that radiates to the back. Denied any additional symptoms including fevers, chills, chest pain, SOB, nausea, emesis. Reports similar pain to this unrelated to PO intake that self resolves within a few minutes, but states pain was not improving so he wanted to [facility] for evaluation. Patient underwent U/S, CT at [facility] which revealed non-dilated gallbladder without cholelithiasis and CT showed mild GBW without other acute pathology and he was discharged on Augmentin.   Patient believed pain may have been gastritis and was evaluated by outpatient GI this AM who sent him to [facility] for evaluation of cholecystitis. Patient reports pain is similar and currently 5/10, but now in the RUQ as well and still radiating to the back.    Allergies: Denies  Social History: Denies tobacco use. Admits to social alcohol use. 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        PAST MEDICAL & SURGICAL HISTORY:  Diabetes          ROS: Endorses abdominal pain, ROS otherwise negative     MEDICATIONS  (STANDING):  lactated ringers. 1000 milliLiter(s) (100 mL/Hr) IV Continuous <Continuous>  piperacillin/tazobactam IVPB. 3.375 Gram(s) IV Intermittent once    MEDICATIONS  (PRN):  HYDROmorphone  Injectable 0.25 milliGRAM(s) IV Push every 6 hours PRN Severe Pain (7 - 10)  ondansetron Injectable 4 milliGRAM(s) IV Push every 6 hours PRN Nausea      Allergies    No Known Allergies    Intolerances        SOCIAL HISTORY:  Smoke: Never Smoker  EtOH: occasional    FAMILY HISTORY:      Vital Signs Last 24 Hrs  T(C): 37.3 (22 Dec 2023 19:34), Max: 37.3 (22 Dec 2023 16:01)  T(F): 99.1 (22 Dec 2023 16:55), Max: 99.1 (22 Dec 2023 16:01)  HR: 108 (22 Dec 2023 19:34) (108 - 122)  BP: 127/89 (22 Dec 2023 19:34) (127/79 - 144/92)  BP(mean): --  RR: 16 (22 Dec 2023 19:34) (16 - 16)  SpO2: 100% (22 Dec 2023 19:34) (100% - 100%)    Parameters below as of 22 Dec 2023 16:01  Patient On (Oxygen Delivery Method): room air        PHYSICAL EXAM    Gen: No acute distress   Neuro: Drowsy, but arousable, no focal neuro defecits   CV: Regular rate and rhythm, no murmurs or rubs, no peripheral edema   Pulm: Lung sounds clear bilaterally   Abd: Soft, NT/ND  Ext: Warm, well-perfused   Skin: Lap sites intact x 4. AIDEN drain present: serosanguinous   MSK: No joint swelling noted  Psych: Normal affect     LABS:                              HPI:  Patient is a 43M with PMHx of DMII and no PSHx who presents to [facility] for evaluation of epigsatric and RUQ abdominal pain. Patient reports pain started Wednesday evening at 11PM without clear inciting factors. States pain was a sharp, punching pain in epigastrium that radiates to the back. Denied any additional symptoms including fevers, chills, chest pain, SOB, nausea, emesis. Reports similar pain to this unrelated to PO intake that self resolves within a few minutes, but states pain was not improving so he wanted to [facility] for evaluation. Patient underwent U/S, CT at [facility] which revealed non-dilated gallbladder without cholelithiasis and CT showed mild GBW without other acute pathology and he was discharged on Augmentin.   Patient believed pain may have been gastritis and was evaluated by outpatient GI this AM who sent him to [facility] for evaluation of cholecystitis. Patient reports pain is similar and currently 5/10, but now in the RUQ as well and still radiating to the back.    Allergies: Denies  Social History: Denies tobacco use. Admits to social alcohol use. 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        PAST MEDICAL & SURGICAL HISTORY:  Diabetes          ROS: Endorses abdominal pain, ROS otherwise negative     MEDICATIONS  (STANDING):  lactated ringers. 1000 milliLiter(s) (100 mL/Hr) IV Continuous <Continuous>  piperacillin/tazobactam IVPB. 3.375 Gram(s) IV Intermittent once    MEDICATIONS  (PRN):  HYDROmorphone  Injectable 0.25 milliGRAM(s) IV Push every 6 hours PRN Severe Pain (7 - 10)  ondansetron Injectable 4 milliGRAM(s) IV Push every 6 hours PRN Nausea      Allergies    No Known Allergies    Intolerances        SOCIAL HISTORY:  Smoke: Never Smoker  EtOH: occasional    FAMILY HISTORY:      Vital Signs Last 24 Hrs  T(C): 37.3 (22 Dec 2023 19:34), Max: 37.3 (22 Dec 2023 16:01)  T(F): 99.1 (22 Dec 2023 16:55), Max: 99.1 (22 Dec 2023 16:01)  HR: 108 (22 Dec 2023 19:34) (108 - 122)  BP: 127/89 (22 Dec 2023 19:34) (127/79 - 144/92)  BP(mean): --  RR: 16 (22 Dec 2023 19:34) (16 - 16)  SpO2: 100% (22 Dec 2023 19:34) (100% - 100%)    Parameters below as of 22 Dec 2023 16:01  Patient On (Oxygen Delivery Method): room air        PHYSICAL EXAM    Gen: No acute distress   Neuro: Drowsy, but arousable, no focal neuro defecits   CV: Regular rate and rhythm, no murmurs or rubs, no peripheral edema   Pulm: Lung sounds clear bilaterally   Abd: Soft, NT/ND  Ext: Warm, well-perfused   Skin: Lap sites intact x 4. AIDEN drain present: serosanguinous   MSK: No joint swelling noted  Psych: Normal affect     LABS:                              HPI:  Patient is a 43M with PMHx of DMII and no PSHx who presents to [facility] for evaluation of sharp, punching epigastric pain radiating to the back and RUQ abdominal pain. Denied  fevers, chills, chest pain, SOB, nausea, emesis. CT at [facility] revealed non-dilated gallbladder without cholelithiasis and CT showed mild GBW without other acute pathology and he was discharged on Augmentin. Patient believed pain may have been gastritis and was evaluated by outpatient GI who sent him to [facility] for evaluation of cholecystitis. RUQ ultrasound consistent with cholecystitis now s/p RA cholecystectomy, lysis of adhesions and umbilical hernia repair. for gangrenous gallbladder.    Recevied: 3L crystalloid, 400mls urine output, EBL 150mls      SICU: Patient seen and examined in PACU. Extubated, no receiving any vasopressor support. Insulin drip infusion with D5 drip. Hemodynamically stable. Drowsy, but able to endorse generalized abdominal pain. STAT labs sent.       Allergies: Denies  Social History: Denies tobacco use. Admits to social alcohol use. 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        PAST MEDICAL & SURGICAL HISTORY:  Diabetes          ROS: Endorses abdominal pain, ROS otherwise negative     MEDICATIONS  (STANDING):  lactated ringers. 1000 milliLiter(s) (100 mL/Hr) IV Continuous <Continuous>  piperacillin/tazobactam IVPB. 3.375 Gram(s) IV Intermittent once    MEDICATIONS  (PRN):  HYDROmorphone  Injectable 0.25 milliGRAM(s) IV Push every 6 hours PRN Severe Pain (7 - 10)  ondansetron Injectable 4 milliGRAM(s) IV Push every 6 hours PRN Nausea      Allergies    No Known Allergies    Intolerances        SOCIAL HISTORY:  Smoke: Never Smoker  EtOH: occasional    FAMILY HISTORY:      Vital Signs Last 24 Hrs  T(C): 37.3 (22 Dec 2023 19:34), Max: 37.3 (22 Dec 2023 16:01)  T(F): 99.1 (22 Dec 2023 16:55), Max: 99.1 (22 Dec 2023 16:01)  HR: 108 (22 Dec 2023 19:34) (108 - 122)  BP: 127/89 (22 Dec 2023 19:34) (127/79 - 144/92)  BP(mean): --  RR: 16 (22 Dec 2023 19:34) (16 - 16)  SpO2: 100% (22 Dec 2023 19:34) (100% - 100%)    Parameters below as of 22 Dec 2023 16:01  Patient On (Oxygen Delivery Method): room air        PHYSICAL EXAM    Gen: No acute distress   Neuro: Drowsy, but arousable, no focal neuro deficits   CV: Regular rate and rhythm, no murmurs or rubs, no peripheral edema   Pulm: Lung sounds clear bilaterally   Abd: Soft, NT/ND  Ext: Warm, well-perfused   Skin: Lap sites intact x 4. AIDEN drain present: serosanguinous   MSK: No joint swelling noted  Psych: Normal affect     LABS:                                     12.9   15.16 )-----------( 168      ( 22 Dec 2023 23:53 )             38.9       12-22    133<L>  |  101  |  15  ----------------------------<  160<H>  4.6   |  21<L>  |  0.68    Ca    8.5      22 Dec 2023 23:53  Phos  2.4     12-22  Mg     1.7     12-22    TPro  6.6  /  Alb  3.3  /  TBili  1.2  /  DBili  0.2  /  AST  40  /  ALT  30  /  AlkPhos  50  12-22    Lactate 1.2    Beta-hydroxy-butyrate: 2.5 from 2.7    ABG - ( 22 Dec 2023 23:41 )  pH, Arterial: 7.34  pH, Blood: x     /  pCO2: 39    /  pO2: 135   / HCO3: 21    / Base Excess: -4.4  /  SaO2: 98.8                 HPI:  Patient is a 43M with PMHx of DMII and no PSHx who presents to [facility] for evaluation of sharp, punching epigastric pain radiating to the back and RUQ abdominal pain. Denied  fevers, chills, chest pain, SOB, nausea, emesis. CT at [facility] revealed non-dilated gallbladder without cholelithiasis and CT showed mild GBW without other acute pathology and he was discharged on Augmentin. Patient believed pain may have been gastritis and was evaluated by outpatient GI who sent him to [facility] for evaluation of cholecystitis. RUQ ultrasound consistent with cholecystitis now s/p RA cholecystectomy, lysis of adhesions and umbilical hernia repair. for gangrenous gallbladder.    Recevied: 3L crystalloid, 400mls urine output, EBL 150mls      SICU: Patient seen and examined in PACU. Extubated, no receiving any vasopressor support. Insulin drip infusion with D5 drip. Hemodynamically stable. Drowsy, but able to endorse generalized abdominal pain. STAT labs sent.       Allergies: Denies  Social History: Denies tobacco use. Admits to social alcohol use. 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        PAST MEDICAL & SURGICAL HISTORY:  Diabetes          ROS: Endorses abdominal pain, ROS otherwise negative     MEDICATIONS  (STANDING):  lactated ringers. 1000 milliLiter(s) (100 mL/Hr) IV Continuous <Continuous>  piperacillin/tazobactam IVPB. 3.375 Gram(s) IV Intermittent once    MEDICATIONS  (PRN):  HYDROmorphone  Injectable 0.25 milliGRAM(s) IV Push every 6 hours PRN Severe Pain (7 - 10)  ondansetron Injectable 4 milliGRAM(s) IV Push every 6 hours PRN Nausea      Allergies    No Known Allergies    Intolerances        SOCIAL HISTORY:  Smoke: Never Smoker  EtOH: occasional    FAMILY HISTORY:      Vital Signs Last 24 Hrs  T(C): 37.3 (22 Dec 2023 19:34), Max: 37.3 (22 Dec 2023 16:01)  T(F): 99.1 (22 Dec 2023 16:55), Max: 99.1 (22 Dec 2023 16:01)  HR: 108 (22 Dec 2023 19:34) (108 - 122)  BP: 127/89 (22 Dec 2023 19:34) (127/79 - 144/92)  BP(mean): --  RR: 16 (22 Dec 2023 19:34) (16 - 16)  SpO2: 100% (22 Dec 2023 19:34) (100% - 100%)    Parameters below as of 22 Dec 2023 16:01  Patient On (Oxygen Delivery Method): room air        PHYSICAL EXAM    Gen: No acute distress   Neuro: Drowsy, but arousable, no focal neuro deficits   CV: Regular rate and rhythm, no murmurs or rubs, no peripheral edema   Pulm: Lung sounds clear bilaterally   Abd: Soft, NT/ND  Ext: Warm, well-perfused   Skin: Lap sites intact x 4. AIDEN drain present: serosanguinous   MSK: No joint swelling noted  Psych: Normal affect     LABS:                                     12.9   15.16 )-----------( 168      ( 22 Dec 2023 23:53 )             38.9       12-22    133<L>  |  101  |  15  ----------------------------<  160<H>  4.6   |  21<L>  |  0.68    Ca    8.5      22 Dec 2023 23:53  Phos  2.4     12-22  Mg     1.7     12-22    TPro  6.6  /  Alb  3.3  /  TBili  1.2  /  DBili  0.2  /  AST  40  /  ALT  30  /  AlkPhos  50  12-22    Lactate 1.2    Beta-hydroxy-butyrate: 2.5 from 2.7    ABG - ( 22 Dec 2023 23:41 )  pH, Arterial: 7.34  pH, Blood: x     /  pCO2: 39    /  pO2: 135   / HCO3: 21    / Base Excess: -4.4  /  SaO2: 98.8

## 2023-12-22 NOTE — H&P ADULT - NSHPPHYSICALEXAM_GEN_ALL_CORE
General: Resting in bed, NAD  Neuro: A&Ox3, no focal deficits  CV: NSR  Pulm: Equal chest wall expansion b/l, no respiratory distress  Abdomen: Soft, nondistended, tender to palpation in epigastrium and RUQ. + Morrow's sign  Extremities: WWP, No edema

## 2023-12-22 NOTE — PRE-ANESTHESIA EVALUATION ADULT - NSANTHPMHFT_GEN_ALL_CORE
Patient is a 43M with PMHx of DMII and no PSHx who presents to [facility] for evaluation of epigsatric and RUQ abdominal pain. Patient reports pain started Wednesday evening at 11PM without clear inciting factors. States pain was a sharp, punching pain in epigastrium that radiates to the back. Denied any additional symptoms including fevers, chills, chest pain, SOB, nausea, emesis. Reports similar pain to this unrelated to PO intake that self resolves within a few minutes, but states pain was not improving so he wanted to [facility] for evaluation. Patient underwent U/S, CT at [facility] which revealed non-dilated gallbladder without cholelithiasis and CT showed mild GBW without other acute pathology and he was discharged on Augmentin.   Patient believed pain may have been gastritis and was evaluated by outpatient GI this AM who sent him to [facility] for evaluation of cholecystitis. Patient reports pain is similar and currently 5/10, but now in the RUQ as well and still radiating to the back.    Medical History: DMII  Surgical History: Denies  Medications: Metformin 1000 BID, Invokana daily  Allergies: Denies  Social History: Denies tobacco use. Admits to social alcohol use. 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 Patient reports pain started Wednesday evening at 11PM without clear inciting factors. States pain was a sharp, punching pain in epigastrium that radiates to the back. Denied any additional symptoms including fevers, chills, chest pain, SOB, nausea, emesis. Reports similar pain to this unrelated to PO intake that self resolves within a few minutes, but states pain was not improving so he wanted to [facility] for evaluation. Patient underwent U/S, CT at [facility] which revealed non-dilated gallbladder without cholelithiasis and CT showed mild GBW without other acute pathology and he was discharged on Augmentin.   Patient believed pain may have been gastritis and was evaluated by outpatient GI this AM who sent him to [facility] for evaluation of cholecystitis. Patient reports pain is similar and currently 5/10, but now in the RUQ as well and still radiating to the back.    Medical History: DMII  Surgical History: Denies  Medications: Metformin 1000 BID, Invokana daily  Allergies: Denies  Social History: Denies tobacco use. Admits to social alcohol use. 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

## 2023-12-22 NOTE — ED PROVIDER NOTE - PHYSICAL EXAMINATION
VITAL SIGNS: I have reviewed nursing notes and confirm.  CONSTITUTIONAL:  in no acute distress.   SKIN:  warm and dry, no acute rash.   HEAD:  normocephalic, atraumatic.  EYES: PERRL, EOM intact; conjunctiva and sclera clear.  ENT: No nasal discharge; airway clear.   NECK: Supple; non tender.  CARD: S1, S2 normal; no murmurs, gallops, or rubs. Regular rate and rhythm.   RESP:  Clear to auscultation b/l, no wheezes, rales or rhonchi.  ABD: Normal bowel sounds; soft; non-distended; ttp ruq > epigastric; no guarding/ rebound. no cvat  MSK: Normal ROM. No clubbing, cyanosis or edema. no ttp bilat le  NEURO: Alert, oriented, grossly unremarkable  PSYCH: Cooperative, mood and affect appropriate.

## 2023-12-22 NOTE — ED PROVIDER NOTE - CLINICAL SUMMARY MEDICAL DECISION MAKING FREE TEXT BOX
Patient sent by Dr. Fernandez felipe's office to be evaluated for cholecystitis based on epigastric pain that is now in the right upper quadrant and abnormal imaging at [facility] suggestive of cholecystitis.  Patient with right upper quadrant tenderness on exam.  Surgery team at bedside at the time of my evaluation and are requesting preop labs and a repeat right upper quadrant ultrasound since patient's initial ultrasound was negative [facility] 2 days ago.  Plan for labs, ultrasound, dispo per surgery.  Pain medications ordered for patient.  Reassess. See progress notes for further mdm related documentation. Patient sent by Dr. Fernandez felipe's office to be evaluated for cholecystitis based on epigastric pain that is now in the right upper quadrant and abnormal imaging at [facility] suggestive of cholecystitis.  Patient with right upper quadrant tenderness on exam.  Surgery team at bedside at the time of my evaluation and are requesting preop labs and a repeat right upper quadrant ultrasound since patient's initial ultrasound was negative [facility] 2 days ago.  Plan for labs, ultrasound, dispo per surgery.  Pain medications ordered for patient.  Reassess. See progress notes for further mdm related documentation.

## 2023-12-22 NOTE — ED ADULT NURSE NOTE - NSFALLUNIVINTERV_ED_ALL_ED
Bed/Stretcher in lowest position, wheels locked, appropriate side rails in place/Call bell, personal items and telephone in reach/Instruct patient to call for assistance before getting out of bed/chair/stretcher/Non-slip footwear applied when patient is off stretcher/[place] to call system/Physically safe environment - no spills, clutter or unnecessary equipment/Purposeful proactive rounding/Room/bathroom lighting operational, light cord in reach Bed/Stretcher in lowest position, wheels locked, appropriate side rails in place/Call bell, personal items and telephone in reach/Instruct patient to call for assistance before getting out of bed/chair/stretcher/Non-slip footwear applied when patient is off stretcher/[place] to call system/Physically safe environment - no spills, clutter or unnecessary equipment/Purposeful proactive rounding/Room/bathroom lighting operational, light cord in reach

## 2023-12-23 LAB
A1C WITH ESTIMATED AVERAGE GLUCOSE RESULT: 8.6 % — HIGH (ref 4–5.6)
A1C WITH ESTIMATED AVERAGE GLUCOSE RESULT: 8.6 % — HIGH (ref 4–5.6)
ALBUMIN SERPL ELPH-MCNC: 3.3 G/DL — SIGNIFICANT CHANGE UP (ref 3.3–5)
ALBUMIN SERPL ELPH-MCNC: 3.3 G/DL — SIGNIFICANT CHANGE UP (ref 3.3–5)
ALBUMIN SERPL ELPH-MCNC: 3.7 G/DL — SIGNIFICANT CHANGE UP (ref 3.3–5)
ALBUMIN SERPL ELPH-MCNC: 3.7 G/DL — SIGNIFICANT CHANGE UP (ref 3.3–5)
ALP SERPL-CCNC: 50 U/L — SIGNIFICANT CHANGE UP (ref 40–120)
ALP SERPL-CCNC: 50 U/L — SIGNIFICANT CHANGE UP (ref 40–120)
ALP SERPL-CCNC: 53 U/L — SIGNIFICANT CHANGE UP (ref 40–120)
ALP SERPL-CCNC: 53 U/L — SIGNIFICANT CHANGE UP (ref 40–120)
ALT FLD-CCNC: 30 U/L — SIGNIFICANT CHANGE UP (ref 10–45)
ALT FLD-CCNC: 30 U/L — SIGNIFICANT CHANGE UP (ref 10–45)
ALT FLD-CCNC: 38 U/L — SIGNIFICANT CHANGE UP (ref 10–45)
ALT FLD-CCNC: 38 U/L — SIGNIFICANT CHANGE UP (ref 10–45)
ANION GAP SERPL CALC-SCNC: 11 MMOL/L — SIGNIFICANT CHANGE UP (ref 5–17)
ANION GAP SERPL CALC-SCNC: 11 MMOL/L — SIGNIFICANT CHANGE UP (ref 5–17)
ANION GAP SERPL CALC-SCNC: 8 MMOL/L — SIGNIFICANT CHANGE UP (ref 5–17)
ANION GAP SERPL CALC-SCNC: 8 MMOL/L — SIGNIFICANT CHANGE UP (ref 5–17)
AST SERPL-CCNC: 40 U/L — SIGNIFICANT CHANGE UP (ref 10–40)
AST SERPL-CCNC: 40 U/L — SIGNIFICANT CHANGE UP (ref 10–40)
AST SERPL-CCNC: 52 U/L — HIGH (ref 10–40)
AST SERPL-CCNC: 52 U/L — HIGH (ref 10–40)
B-OH-BUTYR SERPL-SCNC: 0.8 MMOL/L — HIGH
B-OH-BUTYR SERPL-SCNC: 0.8 MMOL/L — HIGH
B-OH-BUTYR SERPL-SCNC: 2.5 MMOL/L — HIGH
B-OH-BUTYR SERPL-SCNC: 2.5 MMOL/L — HIGH
BACTERIA # UR AUTO: NEGATIVE /HPF — SIGNIFICANT CHANGE UP
BACTERIA # UR AUTO: NEGATIVE /HPF — SIGNIFICANT CHANGE UP
BILIRUB DIRECT SERPL-MCNC: 0.2 MG/DL — SIGNIFICANT CHANGE UP (ref 0–0.3)
BILIRUB DIRECT SERPL-MCNC: 0.2 MG/DL — SIGNIFICANT CHANGE UP (ref 0–0.3)
BILIRUB DIRECT SERPL-MCNC: 0.3 MG/DL — SIGNIFICANT CHANGE UP (ref 0–0.3)
BILIRUB DIRECT SERPL-MCNC: 0.3 MG/DL — SIGNIFICANT CHANGE UP (ref 0–0.3)
BILIRUB INDIRECT FLD-MCNC: 1 MG/DL — SIGNIFICANT CHANGE UP (ref 0.2–1)
BILIRUB INDIRECT FLD-MCNC: 1 MG/DL — SIGNIFICANT CHANGE UP (ref 0.2–1)
BILIRUB INDIRECT FLD-MCNC: 1.2 MG/DL — HIGH (ref 0.2–1)
BILIRUB INDIRECT FLD-MCNC: 1.2 MG/DL — HIGH (ref 0.2–1)
BILIRUB SERPL-MCNC: 1.2 MG/DL — SIGNIFICANT CHANGE UP (ref 0.2–1.2)
BILIRUB SERPL-MCNC: 1.2 MG/DL — SIGNIFICANT CHANGE UP (ref 0.2–1.2)
BILIRUB SERPL-MCNC: 1.4 MG/DL — HIGH (ref 0.2–1.2)
BILIRUB SERPL-MCNC: 1.4 MG/DL — HIGH (ref 0.2–1.2)
BUN SERPL-MCNC: 12 MG/DL — SIGNIFICANT CHANGE UP (ref 7–23)
BUN SERPL-MCNC: 12 MG/DL — SIGNIFICANT CHANGE UP (ref 7–23)
BUN SERPL-MCNC: 15 MG/DL — SIGNIFICANT CHANGE UP (ref 7–23)
BUN SERPL-MCNC: 15 MG/DL — SIGNIFICANT CHANGE UP (ref 7–23)
CALCIUM SERPL-MCNC: 8.5 MG/DL — SIGNIFICANT CHANGE UP (ref 8.4–10.5)
CAST: 3 /LPF — SIGNIFICANT CHANGE UP (ref 0–4)
CAST: 3 /LPF — SIGNIFICANT CHANGE UP (ref 0–4)
CHLORIDE SERPL-SCNC: 101 MMOL/L — SIGNIFICANT CHANGE UP (ref 96–108)
CHLORIDE SERPL-SCNC: 101 MMOL/L — SIGNIFICANT CHANGE UP (ref 96–108)
CHLORIDE SERPL-SCNC: 102 MMOL/L — SIGNIFICANT CHANGE UP (ref 96–108)
CHLORIDE SERPL-SCNC: 102 MMOL/L — SIGNIFICANT CHANGE UP (ref 96–108)
CO2 SERPL-SCNC: 21 MMOL/L — LOW (ref 22–31)
CO2 SERPL-SCNC: 21 MMOL/L — LOW (ref 22–31)
CO2 SERPL-SCNC: 25 MMOL/L — SIGNIFICANT CHANGE UP (ref 22–31)
CO2 SERPL-SCNC: 25 MMOL/L — SIGNIFICANT CHANGE UP (ref 22–31)
CREAT SERPL-MCNC: 0.68 MG/DL — SIGNIFICANT CHANGE UP (ref 0.5–1.3)
CREAT SERPL-MCNC: 0.68 MG/DL — SIGNIFICANT CHANGE UP (ref 0.5–1.3)
CREAT SERPL-MCNC: 0.82 MG/DL — SIGNIFICANT CHANGE UP (ref 0.5–1.3)
CREAT SERPL-MCNC: 0.82 MG/DL — SIGNIFICANT CHANGE UP (ref 0.5–1.3)
EGFR: 113 ML/MIN/1.73M2 — SIGNIFICANT CHANGE UP
EGFR: 113 ML/MIN/1.73M2 — SIGNIFICANT CHANGE UP
EGFR: 120 ML/MIN/1.73M2 — SIGNIFICANT CHANGE UP
EGFR: 120 ML/MIN/1.73M2 — SIGNIFICANT CHANGE UP
ESTIMATED AVERAGE GLUCOSE: 200 MG/DL — HIGH (ref 68–114)
ESTIMATED AVERAGE GLUCOSE: 200 MG/DL — HIGH (ref 68–114)
GLUCOSE BLDC GLUCOMTR-MCNC: 111 MG/DL — HIGH (ref 70–99)
GLUCOSE BLDC GLUCOMTR-MCNC: 111 MG/DL — HIGH (ref 70–99)
GLUCOSE BLDC GLUCOMTR-MCNC: 123 MG/DL — HIGH (ref 70–99)
GLUCOSE BLDC GLUCOMTR-MCNC: 123 MG/DL — HIGH (ref 70–99)
GLUCOSE BLDC GLUCOMTR-MCNC: 126 MG/DL — HIGH (ref 70–99)
GLUCOSE BLDC GLUCOMTR-MCNC: 127 MG/DL — HIGH (ref 70–99)
GLUCOSE BLDC GLUCOMTR-MCNC: 127 MG/DL — HIGH (ref 70–99)
GLUCOSE BLDC GLUCOMTR-MCNC: 132 MG/DL — HIGH (ref 70–99)
GLUCOSE BLDC GLUCOMTR-MCNC: 132 MG/DL — HIGH (ref 70–99)
GLUCOSE BLDC GLUCOMTR-MCNC: 139 MG/DL — HIGH (ref 70–99)
GLUCOSE BLDC GLUCOMTR-MCNC: 139 MG/DL — HIGH (ref 70–99)
GLUCOSE BLDC GLUCOMTR-MCNC: 140 MG/DL — HIGH (ref 70–99)
GLUCOSE BLDC GLUCOMTR-MCNC: 140 MG/DL — HIGH (ref 70–99)
GLUCOSE BLDC GLUCOMTR-MCNC: 141 MG/DL — HIGH (ref 70–99)
GLUCOSE BLDC GLUCOMTR-MCNC: 141 MG/DL — HIGH (ref 70–99)
GLUCOSE BLDC GLUCOMTR-MCNC: 142 MG/DL — HIGH (ref 70–99)
GLUCOSE BLDC GLUCOMTR-MCNC: 142 MG/DL — HIGH (ref 70–99)
GLUCOSE BLDC GLUCOMTR-MCNC: 148 MG/DL — HIGH (ref 70–99)
GLUCOSE BLDC GLUCOMTR-MCNC: 148 MG/DL — HIGH (ref 70–99)
GLUCOSE BLDC GLUCOMTR-MCNC: 149 MG/DL — HIGH (ref 70–99)
GLUCOSE BLDC GLUCOMTR-MCNC: 149 MG/DL — HIGH (ref 70–99)
GLUCOSE BLDC GLUCOMTR-MCNC: 150 MG/DL — HIGH (ref 70–99)
GLUCOSE BLDC GLUCOMTR-MCNC: 150 MG/DL — HIGH (ref 70–99)
GLUCOSE BLDC GLUCOMTR-MCNC: 162 MG/DL — HIGH (ref 70–99)
GLUCOSE BLDC GLUCOMTR-MCNC: 162 MG/DL — HIGH (ref 70–99)
GLUCOSE BLDC GLUCOMTR-MCNC: 172 MG/DL — HIGH (ref 70–99)
GLUCOSE BLDC GLUCOMTR-MCNC: 172 MG/DL — HIGH (ref 70–99)
GLUCOSE BLDC GLUCOMTR-MCNC: 98 MG/DL — SIGNIFICANT CHANGE UP (ref 70–99)
GLUCOSE BLDC GLUCOMTR-MCNC: 98 MG/DL — SIGNIFICANT CHANGE UP (ref 70–99)
GLUCOSE SERPL-MCNC: 158 MG/DL — HIGH (ref 70–99)
GLUCOSE SERPL-MCNC: 158 MG/DL — HIGH (ref 70–99)
GLUCOSE SERPL-MCNC: 160 MG/DL — HIGH (ref 70–99)
GLUCOSE SERPL-MCNC: 160 MG/DL — HIGH (ref 70–99)
GRAM STN FLD: SIGNIFICANT CHANGE UP
GRAM STN FLD: SIGNIFICANT CHANGE UP
HCT VFR BLD CALC: 37.2 % — LOW (ref 39–50)
HCT VFR BLD CALC: 37.2 % — LOW (ref 39–50)
HGB BLD-MCNC: 12.5 G/DL — LOW (ref 13–17)
HGB BLD-MCNC: 12.5 G/DL — LOW (ref 13–17)
MAGNESIUM SERPL-MCNC: 1.7 MG/DL — SIGNIFICANT CHANGE UP (ref 1.6–2.6)
MAGNESIUM SERPL-MCNC: 1.7 MG/DL — SIGNIFICANT CHANGE UP (ref 1.6–2.6)
MAGNESIUM SERPL-MCNC: 2.5 MG/DL — SIGNIFICANT CHANGE UP (ref 1.6–2.6)
MAGNESIUM SERPL-MCNC: 2.5 MG/DL — SIGNIFICANT CHANGE UP (ref 1.6–2.6)
MCHC RBC-ENTMCNC: 27.5 PG — SIGNIFICANT CHANGE UP (ref 27–34)
MCHC RBC-ENTMCNC: 27.5 PG — SIGNIFICANT CHANGE UP (ref 27–34)
MCHC RBC-ENTMCNC: 33.6 GM/DL — SIGNIFICANT CHANGE UP (ref 32–36)
MCHC RBC-ENTMCNC: 33.6 GM/DL — SIGNIFICANT CHANGE UP (ref 32–36)
MCV RBC AUTO: 81.9 FL — SIGNIFICANT CHANGE UP (ref 80–100)
MCV RBC AUTO: 81.9 FL — SIGNIFICANT CHANGE UP (ref 80–100)
NRBC # BLD: 0 /100 WBCS — SIGNIFICANT CHANGE UP (ref 0–0)
NRBC # BLD: 0 /100 WBCS — SIGNIFICANT CHANGE UP (ref 0–0)
PHOSPHATE SERPL-MCNC: 2.4 MG/DL — LOW (ref 2.5–4.5)
PHOSPHATE SERPL-MCNC: 2.4 MG/DL — LOW (ref 2.5–4.5)
PHOSPHATE SERPL-MCNC: 3.3 MG/DL — SIGNIFICANT CHANGE UP (ref 2.5–4.5)
PHOSPHATE SERPL-MCNC: 3.3 MG/DL — SIGNIFICANT CHANGE UP (ref 2.5–4.5)
PLATELET # BLD AUTO: 176 K/UL — SIGNIFICANT CHANGE UP (ref 150–400)
PLATELET # BLD AUTO: 176 K/UL — SIGNIFICANT CHANGE UP (ref 150–400)
POTASSIUM SERPL-MCNC: 4.3 MMOL/L — SIGNIFICANT CHANGE UP (ref 3.5–5.3)
POTASSIUM SERPL-MCNC: 4.3 MMOL/L — SIGNIFICANT CHANGE UP (ref 3.5–5.3)
POTASSIUM SERPL-MCNC: 4.6 MMOL/L — SIGNIFICANT CHANGE UP (ref 3.5–5.3)
POTASSIUM SERPL-MCNC: 4.6 MMOL/L — SIGNIFICANT CHANGE UP (ref 3.5–5.3)
POTASSIUM SERPL-SCNC: 4.3 MMOL/L — SIGNIFICANT CHANGE UP (ref 3.5–5.3)
POTASSIUM SERPL-SCNC: 4.3 MMOL/L — SIGNIFICANT CHANGE UP (ref 3.5–5.3)
POTASSIUM SERPL-SCNC: 4.6 MMOL/L — SIGNIFICANT CHANGE UP (ref 3.5–5.3)
POTASSIUM SERPL-SCNC: 4.6 MMOL/L — SIGNIFICANT CHANGE UP (ref 3.5–5.3)
PROT SERPL-MCNC: 6.3 G/DL — SIGNIFICANT CHANGE UP (ref 6–8.3)
PROT SERPL-MCNC: 6.3 G/DL — SIGNIFICANT CHANGE UP (ref 6–8.3)
PROT SERPL-MCNC: 6.6 G/DL — SIGNIFICANT CHANGE UP (ref 6–8.3)
PROT SERPL-MCNC: 6.6 G/DL — SIGNIFICANT CHANGE UP (ref 6–8.3)
RBC # BLD: 4.54 M/UL — SIGNIFICANT CHANGE UP (ref 4.2–5.8)
RBC # BLD: 4.54 M/UL — SIGNIFICANT CHANGE UP (ref 4.2–5.8)
RBC # FLD: 13.4 % — SIGNIFICANT CHANGE UP (ref 10.3–14.5)
RBC # FLD: 13.4 % — SIGNIFICANT CHANGE UP (ref 10.3–14.5)
RBC CASTS # UR COMP ASSIST: 2 /HPF — SIGNIFICANT CHANGE UP (ref 0–4)
RBC CASTS # UR COMP ASSIST: 2 /HPF — SIGNIFICANT CHANGE UP (ref 0–4)
SODIUM SERPL-SCNC: 133 MMOL/L — LOW (ref 135–145)
SODIUM SERPL-SCNC: 133 MMOL/L — LOW (ref 135–145)
SODIUM SERPL-SCNC: 135 MMOL/L — SIGNIFICANT CHANGE UP (ref 135–145)
SODIUM SERPL-SCNC: 135 MMOL/L — SIGNIFICANT CHANGE UP (ref 135–145)
SPECIMEN SOURCE: SIGNIFICANT CHANGE UP
SPECIMEN SOURCE: SIGNIFICANT CHANGE UP
SQUAMOUS # UR AUTO: 0 /HPF — SIGNIFICANT CHANGE UP (ref 0–5)
SQUAMOUS # UR AUTO: 0 /HPF — SIGNIFICANT CHANGE UP (ref 0–5)
WBC # BLD: 12.83 K/UL — HIGH (ref 3.8–10.5)
WBC # BLD: 12.83 K/UL — HIGH (ref 3.8–10.5)
WBC # FLD AUTO: 12.83 K/UL — HIGH (ref 3.8–10.5)
WBC # FLD AUTO: 12.83 K/UL — HIGH (ref 3.8–10.5)
WBC UR QL: 1 /HPF — SIGNIFICANT CHANGE UP (ref 0–5)
WBC UR QL: 1 /HPF — SIGNIFICANT CHANGE UP (ref 0–5)

## 2023-12-23 PROCEDURE — 99232 SBSQ HOSP IP/OBS MODERATE 35: CPT | Mod: GC

## 2023-12-23 RX ORDER — DEXTROSE 50 % IN WATER 50 %
25 SYRINGE (ML) INTRAVENOUS ONCE
Refills: 0 | Status: DISCONTINUED | OUTPATIENT
Start: 2023-12-23 | End: 2023-12-25

## 2023-12-23 RX ORDER — ACETAMINOPHEN 500 MG
1000 TABLET ORAL ONCE
Refills: 0 | Status: COMPLETED | OUTPATIENT
Start: 2023-12-23 | End: 2023-12-23

## 2023-12-23 RX ORDER — TAMSULOSIN HYDROCHLORIDE 0.4 MG/1
0.4 CAPSULE ORAL ONCE
Refills: 0 | Status: COMPLETED | OUTPATIENT
Start: 2023-12-23 | End: 2023-12-23

## 2023-12-23 RX ORDER — SODIUM CHLORIDE 9 MG/ML
1000 INJECTION, SOLUTION INTRAVENOUS
Refills: 0 | Status: DISCONTINUED | OUTPATIENT
Start: 2023-12-23 | End: 2023-12-25

## 2023-12-23 RX ORDER — HUMAN INSULIN 100 [IU]/ML
10 INJECTION, SUSPENSION SUBCUTANEOUS ONCE
Refills: 0 | Status: COMPLETED | OUTPATIENT
Start: 2023-12-23 | End: 2023-12-23

## 2023-12-23 RX ORDER — OXYCODONE HYDROCHLORIDE 5 MG/1
5 TABLET ORAL EVERY 6 HOURS
Refills: 0 | Status: DISCONTINUED | OUTPATIENT
Start: 2023-12-23 | End: 2023-12-25

## 2023-12-23 RX ORDER — INSULIN LISPRO 100/ML
VIAL (ML) SUBCUTANEOUS
Refills: 0 | Status: DISCONTINUED | OUTPATIENT
Start: 2023-12-23 | End: 2023-12-25

## 2023-12-23 RX ORDER — HYDROMORPHONE HYDROCHLORIDE 2 MG/ML
0.25 INJECTION INTRAMUSCULAR; INTRAVENOUS; SUBCUTANEOUS
Refills: 0 | Status: DISCONTINUED | OUTPATIENT
Start: 2023-12-23 | End: 2023-12-23

## 2023-12-23 RX ORDER — HEPARIN SODIUM 5000 [USP'U]/ML
5000 INJECTION INTRAVENOUS; SUBCUTANEOUS EVERY 8 HOURS
Refills: 0 | Status: DISCONTINUED | OUTPATIENT
Start: 2023-12-23 | End: 2023-12-25

## 2023-12-23 RX ORDER — SODIUM CHLORIDE 9 MG/ML
1000 INJECTION, SOLUTION INTRAVENOUS
Refills: 0 | Status: DISCONTINUED | OUTPATIENT
Start: 2023-12-23 | End: 2023-12-23

## 2023-12-23 RX ORDER — DEXTROSE 50 % IN WATER 50 %
12.5 SYRINGE (ML) INTRAVENOUS ONCE
Refills: 0 | Status: DISCONTINUED | OUTPATIENT
Start: 2023-12-23 | End: 2023-12-25

## 2023-12-23 RX ORDER — DEXTROSE 50 % IN WATER 50 %
15 SYRINGE (ML) INTRAVENOUS ONCE
Refills: 0 | Status: DISCONTINUED | OUTPATIENT
Start: 2023-12-23 | End: 2023-12-25

## 2023-12-23 RX ORDER — ACETAMINOPHEN 500 MG
650 TABLET ORAL EVERY 6 HOURS
Refills: 0 | Status: DISCONTINUED | OUTPATIENT
Start: 2023-12-23 | End: 2023-12-25

## 2023-12-23 RX ORDER — MAGNESIUM SULFATE 500 MG/ML
2 VIAL (ML) INJECTION ONCE
Refills: 0 | Status: COMPLETED | OUTPATIENT
Start: 2023-12-23 | End: 2023-12-23

## 2023-12-23 RX ORDER — GLUCAGON INJECTION, SOLUTION 0.5 MG/.1ML
1 INJECTION, SOLUTION SUBCUTANEOUS ONCE
Refills: 0 | Status: DISCONTINUED | OUTPATIENT
Start: 2023-12-23 | End: 2023-12-25

## 2023-12-23 RX ORDER — HYDROMORPHONE HYDROCHLORIDE 2 MG/ML
0.5 INJECTION INTRAMUSCULAR; INTRAVENOUS; SUBCUTANEOUS
Refills: 0 | Status: DISCONTINUED | OUTPATIENT
Start: 2023-12-23 | End: 2023-12-23

## 2023-12-23 RX ADMIN — Medication 1000 MILLIGRAM(S): at 12:41

## 2023-12-23 RX ADMIN — HYDROMORPHONE HYDROCHLORIDE 0.5 MILLIGRAM(S): 2 INJECTION INTRAMUSCULAR; INTRAVENOUS; SUBCUTANEOUS at 09:43

## 2023-12-23 RX ADMIN — Medication 25 GRAM(S): at 01:37

## 2023-12-23 RX ADMIN — TAMSULOSIN HYDROCHLORIDE 0.4 MILLIGRAM(S): 0.4 CAPSULE ORAL at 22:30

## 2023-12-23 RX ADMIN — PIPERACILLIN AND TAZOBACTAM 25 GRAM(S): 4; .5 INJECTION, POWDER, LYOPHILIZED, FOR SOLUTION INTRAVENOUS at 07:37

## 2023-12-23 RX ADMIN — HYDROMORPHONE HYDROCHLORIDE 0.5 MILLIGRAM(S): 2 INJECTION INTRAMUSCULAR; INTRAVENOUS; SUBCUTANEOUS at 15:19

## 2023-12-23 RX ADMIN — OXYCODONE HYDROCHLORIDE 5 MILLIGRAM(S): 5 TABLET ORAL at 17:24

## 2023-12-23 RX ADMIN — OXYCODONE HYDROCHLORIDE 5 MILLIGRAM(S): 5 TABLET ORAL at 16:45

## 2023-12-23 RX ADMIN — Medication 62.5 MILLIMOLE(S): at 04:33

## 2023-12-23 RX ADMIN — HYDROMORPHONE HYDROCHLORIDE 0.5 MILLIGRAM(S): 2 INJECTION INTRAMUSCULAR; INTRAVENOUS; SUBCUTANEOUS at 14:34

## 2023-12-23 RX ADMIN — HUMAN INSULIN 10 UNIT(S): 100 INJECTION, SUSPENSION SUBCUTANEOUS at 09:35

## 2023-12-23 RX ADMIN — Medication 1000 MILLIGRAM(S): at 17:23

## 2023-12-23 RX ADMIN — Medication 400 MILLIGRAM(S): at 16:46

## 2023-12-23 RX ADMIN — HYDROMORPHONE HYDROCHLORIDE 0.5 MILLIGRAM(S): 2 INJECTION INTRAMUSCULAR; INTRAVENOUS; SUBCUTANEOUS at 02:07

## 2023-12-23 RX ADMIN — HEPARIN SODIUM 5000 UNIT(S): 5000 INJECTION INTRAVENOUS; SUBCUTANEOUS at 14:33

## 2023-12-23 RX ADMIN — Medication 1: at 11:10

## 2023-12-23 RX ADMIN — CHLORHEXIDINE GLUCONATE 1 APPLICATION(S): 213 SOLUTION TOPICAL at 05:33

## 2023-12-23 RX ADMIN — HYDROMORPHONE HYDROCHLORIDE 0.5 MILLIGRAM(S): 2 INJECTION INTRAMUSCULAR; INTRAVENOUS; SUBCUTANEOUS at 11:01

## 2023-12-23 RX ADMIN — HEPARIN SODIUM 5000 UNIT(S): 5000 INJECTION INTRAVENOUS; SUBCUTANEOUS at 22:29

## 2023-12-23 RX ADMIN — PIPERACILLIN AND TAZOBACTAM 25 GRAM(S): 4; .5 INJECTION, POWDER, LYOPHILIZED, FOR SOLUTION INTRAVENOUS at 14:33

## 2023-12-23 RX ADMIN — HYDROMORPHONE HYDROCHLORIDE 0.25 MILLIGRAM(S): 2 INJECTION INTRAMUSCULAR; INTRAVENOUS; SUBCUTANEOUS at 06:00

## 2023-12-23 RX ADMIN — Medication 400 MILLIGRAM(S): at 11:35

## 2023-12-23 RX ADMIN — HYDROMORPHONE HYDROCHLORIDE 0.25 MILLIGRAM(S): 2 INJECTION INTRAMUSCULAR; INTRAVENOUS; SUBCUTANEOUS at 00:04

## 2023-12-23 RX ADMIN — PIPERACILLIN AND TAZOBACTAM 25 GRAM(S): 4; .5 INJECTION, POWDER, LYOPHILIZED, FOR SOLUTION INTRAVENOUS at 22:30

## 2023-12-23 RX ADMIN — PIPERACILLIN AND TAZOBACTAM 25 GRAM(S): 4; .5 INJECTION, POWDER, LYOPHILIZED, FOR SOLUTION INTRAVENOUS at 01:55

## 2023-12-23 RX ADMIN — HYDROMORPHONE HYDROCHLORIDE 0.25 MILLIGRAM(S): 2 INJECTION INTRAMUSCULAR; INTRAVENOUS; SUBCUTANEOUS at 05:45

## 2023-12-23 RX ADMIN — OXYCODONE HYDROCHLORIDE 5 MILLIGRAM(S): 5 TABLET ORAL at 23:05

## 2023-12-23 NOTE — PROGRESS NOTE ADULT - SUBJECTIVE AND OBJECTIVE BOX
INTERVAL HPI/OVERNIGHT EVENTS:   SURGERY ATTENDING    STATUS POST:  Robotic cholecystectomy, Lysis of adhesions, evacuation of Right Upper quadrant abscess, washout, Drainage.      POST OPERATIVE DAY #: 1    SUBJECTIVE:  Flatus: [ ] YES [x ] NO             Bowel Movement: [ ] YES [x ] NO  Pain (0-10):       3     Pain Control Adequate: [x ] YES [ ] NO  Nausea: [ ] YES [x ] NO            Vomiting: [ ] YES [x ] NO  Diarrhea: [ ] YES [x ] NO         Constipation: [ ] YES [x ] NO     Chest Pain: [ ] YES [x ] NO    SOB:  [ ] YES [x ] NO    MEDICATIONS  (STANDING):  acetaminophen   IVPB .. 1000 milliGRAM(s) IV Intermittent once  chlorhexidine 2% Cloths 1 Application(s) Topical <User Schedule>  dextrose 5%. 1000 milliLiter(s) (50 mL/Hr) IV Continuous <Continuous>  dextrose 5%. 1000 milliLiter(s) (100 mL/Hr) IV Continuous <Continuous>  dextrose 50% Injectable 25 Gram(s) IV Push once  dextrose 50% Injectable 25 Gram(s) IV Push once  dextrose 50% Injectable 12.5 Gram(s) IV Push once  dextrose 50% Injectable 50 milliLiter(s) IV Push every 15 minutes  glucagon  Injectable 1 milliGRAM(s) IntraMuscular once  heparin   Injectable 5000 Unit(s) SubCutaneous every 8 hours  insulin lispro (ADMELOG) corrective regimen sliding scale   SubCutaneous three times a day before meals  piperacillin/tazobactam IVPB.. 3.375 Gram(s) IV Intermittent every 8 hours  tamsulosin 0.4 milliGRAM(s) Oral once    MEDICATIONS  (PRN):  acetaminophen     Tablet .. 650 milliGRAM(s) Oral every 6 hours PRN Mild Pain (1 - 3), Moderate Pain (4 - 6)  dextrose Oral Gel 15 Gram(s) Oral once PRN Blood Glucose LESS THAN 70 milliGRAM(s)/deciliter  ondansetron Injectable 4 milliGRAM(s) IV Push every 6 hours PRN Nausea  oxyCODONE    IR 5 milliGRAM(s) Oral every 6 hours PRN Severe Pain (7 - 10)      Vital Signs Last 24 Hrs  T(C): 35.6 (23 Dec 2023 06:00), Max: 37.3 (22 Dec 2023 16:55)  T(F): 96 (23 Dec 2023 06:00), Max: 99.1 (22 Dec 2023 16:55)  HR: 93 (23 Dec 2023 11:00) (82 - 108)  BP: 116/72 (23 Dec 2023 11:00) (102/59 - 156/71)  BP(mean): 89 (23 Dec 2023 11:00) (72 - 111)  RR: 14 (23 Dec 2023 11:00) (12 - 18)  SpO2: 97% (23 Dec 2023 11:00) (96% - 100%)    Parameters below as of 23 Dec 2023 08:00  Patient On (Oxygen Delivery Method): room air        PHYSICAL EXAM:      Constitutional:    Eyes:    ENMT:    Neck:    Breasts:    Back:    Respiratory:    Cardiovascular:    Gastrointestinal:    Genitourinary:    Rectal:    Extremities:    Vascular:    Neurological:    Skin:    Lymph Nodes:    Musculoskeletal:    Psychiatric:        I&O's Detail    22 Dec 2023 07:01  -  23 Dec 2023 07:00  --------------------------------------------------------  IN:    dextrose 5% + lactated ringers: 110 mL    dextrose 5% + sodium chloride 0.9%: 770 mL    Insulin: 16 mL    IV PiggyBack: 125 mL    Oral Fluid: 220 mL  Total IN: 1241 mL    OUT:    Bulb (mL): 70 mL    Indwelling Catheter - Urethral (mL): 1820 mL  Total OUT: 1890 mL    Total NET: -649 mL      23 Dec 2023 07:01  -  23 Dec 2023 16:27  --------------------------------------------------------  IN:    dextrose 5% + sodium chloride 0.9%: 110 mL    Insulin: 2 mL    IV PiggyBack: 25 mL    IV PiggyBack: 62.5 mL    IV PiggyBack: 100 mL    Oral Fluid: 350 mL  Total IN: 649.5 mL    OUT:    Indwelling Catheter - Urethral (mL): 800 mL  Total OUT: 800 mL    Total NET: -150.5 mL          LABS:                        12.5   12.83 )-----------( 176      ( 23 Dec 2023 05:03 )             37.2     12-23    135  |  102  |  12  ----------------------------<  158<H>  4.3   |  25  |  0.82    Ca    8.5      23 Dec 2023 05:03  Phos  3.3     12-23  Mg     2.5     12-23    TPro  6.3  /  Alb  3.7  /  TBili  1.4<H>  /  DBili  0.3  /  AST  52<H>  /  ALT  38  /  AlkPhos  53  12-23    PT/INR - ( 22 Dec 2023 14:42 )   PT: 14.4 sec;   INR: 1.27          PTT - ( 22 Dec 2023 14:42 )  PTT:29.5 sec  Urinalysis Basic - ( 23 Dec 2023 05:03 )    Color: x / Appearance: x / SG: x / pH: x  Gluc: 158 mg/dL / Ketone: x  / Bili: x / Urobili: x   Blood: x / Protein: x / Nitrite: x   Leuk Esterase: x / RBC: x / WBC x   Sq Epi: x / Non Sq Epi: x / Bacteria: x        RADIOLOGY & ADDITIONAL STUDIES:

## 2023-12-23 NOTE — PROGRESS NOTE ADULT - ASSESSMENT
43M with PMHx of DMII and no PSHx who presents to [facility] for evaluation of RUQ abdominal pain. Admitted for gangrenous cholecystitis now s/p lap migue (12/22). Course c/b euglycemic DKA, in SICU for insulin gtt.     Continue to monitor BS and ABG  Rest of care per SICU 43M with PMHx of DMII and no PSHx who presents to [facility] for evaluation of RUQ abdominal pain. Admitted for gangrenous cholecystitis now s/p lap migue (12/22). Course c/b euglycemic DKA, in SICU for insulin gtt.     Continue to monitor BS and ABG  Rest of care per SICU

## 2023-12-23 NOTE — PROGRESS NOTE ADULT - SUBJECTIVE AND OBJECTIVE BOX
SUBJECTIVE:  Patient was evaluated at bedside this AM by the general surgery team in PACU. Patient is doing well this morning and denies pain. Patient has not had CLD as of this morning.    MEDICATIONS  (STANDING):  acetaminophen   IVPB .. 1000 milliGRAM(s) IV Intermittent once  chlorhexidine 2% Cloths 1 Application(s) Topical <User Schedule>  dextrose 5%. 1000 milliLiter(s) (50 mL/Hr) IV Continuous <Continuous>  dextrose 5%. 1000 milliLiter(s) (100 mL/Hr) IV Continuous <Continuous>  dextrose 50% Injectable 25 Gram(s) IV Push once  dextrose 50% Injectable 25 Gram(s) IV Push once  dextrose 50% Injectable 12.5 Gram(s) IV Push once  dextrose 50% Injectable 50 milliLiter(s) IV Push every 15 minutes  glucagon  Injectable 1 milliGRAM(s) IntraMuscular once  heparin   Injectable 5000 Unit(s) SubCutaneous every 8 hours  insulin lispro (ADMELOG) corrective regimen sliding scale   SubCutaneous three times a day before meals  piperacillin/tazobactam IVPB.. 3.375 Gram(s) IV Intermittent every 8 hours  tamsulosin 0.4 milliGRAM(s) Oral once    MEDICATIONS  (PRN):  dextrose Oral Gel 15 Gram(s) Oral once PRN Blood Glucose LESS THAN 70 milliGRAM(s)/deciliter  HYDROmorphone  Injectable 0.25 milliGRAM(s) IV Push every 3 hours PRN Moderate Pain (4 - 6)  HYDROmorphone  Injectable 0.5 milliGRAM(s) IV Push every 3 hours PRN Severe Pain (7 - 10)  ondansetron Injectable 4 milliGRAM(s) IV Push every 6 hours PRN Nausea      Vital Signs Last 24 Hrs  T(C): 35.6 (23 Dec 2023 06:00), Max: 37.3 (22 Dec 2023 16:01)  T(F): 96 (23 Dec 2023 06:00), Max: 99.1 (22 Dec 2023 16:01)  HR: 93 (23 Dec 2023 11:00) (82 - 122)  BP: 116/72 (23 Dec 2023 11:00) (102/59 - 156/71)  BP(mean): 89 (23 Dec 2023 11:00) (72 - 111)  RR: 14 (23 Dec 2023 11:00) (12 - 18)  SpO2: 97% (23 Dec 2023 11:00) (96% - 100%)    Parameters below as of 23 Dec 2023 08:00  Patient On (Oxygen Delivery Method): room air        Physical Exam:  General: NAD, resting comfortably in bed  Pulmonary: Nonlabored breathing, no respiratory distress  Cardiovascular: NSR  Abdominal: soft, TTP at drain site, mild distension; AIDEN SS  Extremities: WWP, normal strength  Neuro: A/O x 3, CNs II-XII grossly intact, no focal deficits, normal motor/sensation  Pulses: palpable distal pulses    I&O's Summary    22 Dec 2023 07:01  -  23 Dec 2023 07:00  --------------------------------------------------------  IN: 1241 mL / OUT: 1890 mL / NET: -649 mL    23 Dec 2023 07:01  -  23 Dec 2023 13:21  --------------------------------------------------------  IN: 649.5 mL / OUT: 800 mL / NET: -150.5 mL        LABS:                        12.5   12.83 )-----------( 176      ( 23 Dec 2023 05:03 )             37.2     12-23    135  |  102  |  12  ----------------------------<  158<H>  4.3   |  25  |  0.82    Ca    8.5      23 Dec 2023 05:03  Phos  3.3     12-23  Mg     2.5     12-23    TPro  6.3  /  Alb  3.7  /  TBili  1.4<H>  /  DBili  0.3  /  AST  52<H>  /  ALT  38  /  AlkPhos  53  12-23    PT/INR - ( 22 Dec 2023 14:42 )   PT: 14.4 sec;   INR: 1.27          PTT - ( 22 Dec 2023 14:42 )  PTT:29.5 sec  Urinalysis Basic - ( 23 Dec 2023 05:03 )    Color: x / Appearance: x / SG: x / pH: x  Gluc: 158 mg/dL / Ketone: x  / Bili: x / Urobili: x   Blood: x / Protein: x / Nitrite: x   Leuk Esterase: x / RBC: x / WBC x   Sq Epi: x / Non Sq Epi: x / Bacteria: x      CAPILLARY BLOOD GLUCOSE      POCT Blood Glucose.: 123 mg/dL (23 Dec 2023 12:45)  POCT Blood Glucose.: 162 mg/dL (23 Dec 2023 11:53)  POCT Blood Glucose.: 172 mg/dL (23 Dec 2023 10:45)  POCT Blood Glucose.: 127 mg/dL (23 Dec 2023 09:31)  POCT Blood Glucose.: 142 mg/dL (23 Dec 2023 08:29)  POCT Blood Glucose.: 150 mg/dL (23 Dec 2023 07:43)  POCT Blood Glucose.: 140 mg/dL (23 Dec 2023 06:33)  POCT Blood Glucose.: 141 mg/dL (23 Dec 2023 05:31)  POCT Blood Glucose.: 132 mg/dL (23 Dec 2023 04:32)  POCT Blood Glucose.: 148 mg/dL (23 Dec 2023 03:41)  POCT Blood Glucose.: 126 mg/dL (23 Dec 2023 02:22)  POCT Blood Glucose.: 149 mg/dL (23 Dec 2023 01:30)  POCT Blood Glucose.: 139 mg/dL (23 Dec 2023 00:24)  POCT Blood Glucose.: 129 mg/dL (22 Dec 2023 23:04)  POCT Blood Glucose.: 139 mg/dL (22 Dec 2023 16:56)    LIVER FUNCTIONS - ( 23 Dec 2023 05:03 )  Alb: 3.7 g/dL / Pro: 6.3 g/dL / ALK PHOS: 53 U/L / ALT: 38 U/L / AST: 52 U/L / GGT: x             RADIOLOGY & ADDITIONAL STUDIES:

## 2023-12-23 NOTE — PROGRESS NOTE ADULT - SUBJECTIVE AND OBJECTIVE BOX
INTERVAL/OVERNIGHT EVENTS: s/p lap cholecystectomy yesterday, c/b euglycemic DKA and started on insulin gtt.     SUBJECTIVE: This AM, doing well without complaints. No N/V and abd pain limited to abd incisions. No CP/SOB. Voids via maldonado. Tolerating clear liquids.     Neurologic Medications  HYDROmorphone  Injectable 0.25 milliGRAM(s) IV Push every 3 hours PRN Moderate Pain (4 - 6)  HYDROmorphone  Injectable 0.5 milliGRAM(s) IV Push every 3 hours PRN Severe Pain (7 - 10)  ondansetron Injectable 4 milliGRAM(s) IV Push every 6 hours PRN Nausea    Gastrointestinal Medications  dextrose 5%. 1000 milliLiter(s) IV Continuous <Continuous>  dextrose 5%. 1000 milliLiter(s) IV Continuous <Continuous>    Antimicrobial/Immunologic Medications  piperacillin/tazobactam IVPB.. 3.375 Gram(s) IV Intermittent every 8 hours    Endocrine/Metabolic Medications  insulin lispro (ADMELOG) corrective regimen sliding scale   SubCutaneous three times a day before meals    Topical/Other Medications  chlorhexidine 2% Cloths 1 Application(s) Topical <User Schedule>    MEDICATIONS  (PRN):  dextrose Oral Gel 15 Gram(s) Oral once PRN Blood Glucose LESS THAN 70 milliGRAM(s)/deciliter  HYDROmorphone  Injectable 0.25 milliGRAM(s) IV Push every 3 hours PRN Moderate Pain (4 - 6)  HYDROmorphone  Injectable 0.5 milliGRAM(s) IV Push every 3 hours PRN Severe Pain (7 - 10)  ondansetron Injectable 4 milliGRAM(s) IV Push every 6 hours PRN Nausea    I&O's Detail    22 Dec 2023 07:01  -  23 Dec 2023 07:00  --------------------------------------------------------  IN:    dextrose 5% + lactated ringers: 110 mL    dextrose 5% + sodium chloride 0.9%: 770 mL    Insulin: 16 mL    IV PiggyBack: 125 mL    Oral Fluid: 220 mL  Total IN: 1241 mL    OUT:    Bulb (mL): 70 mL    Indwelling Catheter - Urethral (mL): 1820 mL  Total OUT: 1890 mL    Total NET: -649 mL    23 Dec 2023 07:01  -  23 Dec 2023 10:55  --------------------------------------------------------  IN:    dextrose 5% + sodium chloride 0.9%: 110 mL    Insulin: 2 mL    IV PiggyBack: 25 mL    IV PiggyBack: 62.5 mL    Oral Fluid: 100 mL  Total IN: 299.5 mL    OUT:    Indwelling Catheter - Urethral (mL): 300 mL  Total OUT: 300 mL    Total NET: -0.5 mL    Vital Signs Last 24 Hrs  T(C): 35.6 (23 Dec 2023 06:00), Max: 37.3 (22 Dec 2023 16:01)  T(F): 96 (23 Dec 2023 06:00), Max: 99.1 (22 Dec 2023 16:01)  HR: 98 (23 Dec 2023 10:00) (82 - 122)  BP: 124/76 (23 Dec 2023 10:00) (102/59 - 156/71)  BP(mean): 93 (23 Dec 2023 10:00) (72 - 111)  RR: 17 (23 Dec 2023 10:00) (12 - 18)  SpO2: 97% (23 Dec 2023 10:00) (96% - 100%)    Parameters below as of 23 Dec 2023 08:00  Patient On (Oxygen Delivery Method): room air    GENERAL: NAD, resting comfortably in bed, AxOx3  HEENT: NCAT, MMM  C/V: Normal rate, normal peripheral perfusion, no murmurs  PULM: Nonlabored breathing, no respiratory distress, decreased sounds at b/l bases, no rhonchi/crackles  ABD: Soft, ND, no rebound tenderness, no guarding, TTP generalized danita in R side abdomen, lap incisions CDI, AIDEN SS  EXTREM: WWP, no edema, SCDs in place  NEURO: No focal deficits    LABS:                        12.5   12.83 )-----------( 176      ( 23 Dec 2023 05:03 )             37.2     12-23    135  |  102  |  12  ----------------------------<  158<H>  4.3   |  25  |  0.82    Ca    8.5      23 Dec 2023 05:03  Phos  3.3     12-23  Mg     2.5     12-23    TPro  6.3  /  Alb  3.7  /  TBili  1.4<H>  /  DBili  0.3  /  AST  52<H>  /  ALT  38  /  AlkPhos  53  12-23    PT/INR - ( 22 Dec 2023 14:42 )   PT: 14.4 sec;   INR: 1.27       PTT - ( 22 Dec 2023 14:42 )  PTT:29.5 sec  Urinalysis Basic - ( 23 Dec 2023 05:03 )    Color: x / Appearance: x / SG: x / pH: x  Gluc: 158 mg/dL / Ketone: x  / Bili: x / Urobili: x   Blood: x / Protein: x / Nitrite: x   Leuk Esterase: x / RBC: x / WBC x   Sq Epi: x / Non Sq Epi: x / Bacteria: x    RADIOLOGY & ADDITIONAL STUDIES:    Culture - Body Fluid with Gram Stain (collected 12-22-23 @ 22:44)  Source: .Body Fluid gallbladder pus or specimen  Gram Stain (12-23-23 @ 00:23):    No organisms seen    No White blood cells  Preliminary Report (12-23-23 @ 08:49):    Culture in progress

## 2023-12-23 NOTE — PROGRESS NOTE ADULT - ASSESSMENT
43M with PMHx of DMII and no PSHx who presents to [facility] for evaluation of RUQ abdominal pain. Admitted for gangrenous cholecystitis now s/p lap migue (12/22). Course c/b euglycemic DKA, in SICU for insulin gtt.     Neuro: Pain: Tylenol and Dilaudid PRN. Nausea: Zofran PRN  HEENT: No concerns  CV: Goal MAP > 65.   Pulm: Sating well on nasal canula   GI: CC CLD until consistent flatus. HLIV.   : Remove maldonado at midnight. Flomax x 1. Strict I and O.   Endo: Hx: DM type II, A1C 8.6, last dose Canagliflozin 12/21 with Euglycemic DKA: BHB 2.7 now 0.9, Anion Gap now 8, Insulin gtt now off, s/p NPH 10 this AM. Holding Metformin and Canagliflozin.   ID: Gangrenous Cholecystitis: Zosyn (12/22--)   Heme/PPx: HSQ to resume this afternoon.  PT/OT: Deferred  Dispo: SDU this afternoon.         43M with PMHx of DMII and no PSHx who presents to [facility] for evaluation of RUQ abdominal pain. Admitted for gangrenous cholecystitis now s/p lap migue (12/22). Course c/b euglycemic DKA, in SICU for insulin gtt.     Neuro: Pain: Tylenol and Dilaudid PRN. Nausea: Zofran PRN  HEENT: No concerns  CV: Goal MAP > 65.   Pulm: Sating well on nasal canula   GI: CC CLD until consistent flatus. HLIV.   : Remove maldonado at midnight. Flomax x 1. Strict I and O.   Endo: Hx: DM type II, A1C 8.6, last dose Canagliflozin 12/21 with Euglycemic DKA: BHB 2.7 now 0.9, Anion Gap now 8, Insulin gtt now off, s/p NPH 10 this AM. Holding Metformin and Canagliflozin.   ID: Gangrenous Cholecystitis: Zosyn (12/22--)   Heme/PPx: HSQ to resume this afternoon.  PT/OT: Deferred  Dispo: SDU this afternoon.

## 2023-12-24 LAB
ALBUMIN SERPL ELPH-MCNC: 3.1 G/DL — LOW (ref 3.3–5)
ALBUMIN SERPL ELPH-MCNC: 3.1 G/DL — LOW (ref 3.3–5)
ALP SERPL-CCNC: 47 U/L — SIGNIFICANT CHANGE UP (ref 40–120)
ALP SERPL-CCNC: 47 U/L — SIGNIFICANT CHANGE UP (ref 40–120)
ALT FLD-CCNC: 46 U/L — HIGH (ref 10–45)
ALT FLD-CCNC: 46 U/L — HIGH (ref 10–45)
ANION GAP SERPL CALC-SCNC: 9 MMOL/L — SIGNIFICANT CHANGE UP (ref 5–17)
ANION GAP SERPL CALC-SCNC: 9 MMOL/L — SIGNIFICANT CHANGE UP (ref 5–17)
AST SERPL-CCNC: 41 U/L — HIGH (ref 10–40)
AST SERPL-CCNC: 41 U/L — HIGH (ref 10–40)
BILIRUB DIRECT SERPL-MCNC: 0.2 MG/DL — SIGNIFICANT CHANGE UP (ref 0–0.3)
BILIRUB DIRECT SERPL-MCNC: 0.2 MG/DL — SIGNIFICANT CHANGE UP (ref 0–0.3)
BILIRUB SERPL-MCNC: 1.3 MG/DL — HIGH (ref 0.2–1.2)
BILIRUB SERPL-MCNC: 1.3 MG/DL — HIGH (ref 0.2–1.2)
BUN SERPL-MCNC: 10 MG/DL — SIGNIFICANT CHANGE UP (ref 7–23)
BUN SERPL-MCNC: 10 MG/DL — SIGNIFICANT CHANGE UP (ref 7–23)
CALCIUM SERPL-MCNC: 8.4 MG/DL — SIGNIFICANT CHANGE UP (ref 8.4–10.5)
CALCIUM SERPL-MCNC: 8.4 MG/DL — SIGNIFICANT CHANGE UP (ref 8.4–10.5)
CHLORIDE SERPL-SCNC: 102 MMOL/L — SIGNIFICANT CHANGE UP (ref 96–108)
CHLORIDE SERPL-SCNC: 102 MMOL/L — SIGNIFICANT CHANGE UP (ref 96–108)
CO2 SERPL-SCNC: 26 MMOL/L — SIGNIFICANT CHANGE UP (ref 22–31)
CO2 SERPL-SCNC: 26 MMOL/L — SIGNIFICANT CHANGE UP (ref 22–31)
CREAT SERPL-MCNC: 0.78 MG/DL — SIGNIFICANT CHANGE UP (ref 0.5–1.3)
CREAT SERPL-MCNC: 0.78 MG/DL — SIGNIFICANT CHANGE UP (ref 0.5–1.3)
EGFR: 115 ML/MIN/1.73M2 — SIGNIFICANT CHANGE UP
EGFR: 115 ML/MIN/1.73M2 — SIGNIFICANT CHANGE UP
GLUCOSE BLDC GLUCOMTR-MCNC: 123 MG/DL — HIGH (ref 70–99)
GLUCOSE BLDC GLUCOMTR-MCNC: 123 MG/DL — HIGH (ref 70–99)
GLUCOSE BLDC GLUCOMTR-MCNC: 137 MG/DL — HIGH (ref 70–99)
GLUCOSE BLDC GLUCOMTR-MCNC: 137 MG/DL — HIGH (ref 70–99)
GLUCOSE BLDC GLUCOMTR-MCNC: 83 MG/DL — SIGNIFICANT CHANGE UP (ref 70–99)
GLUCOSE BLDC GLUCOMTR-MCNC: 83 MG/DL — SIGNIFICANT CHANGE UP (ref 70–99)
GLUCOSE SERPL-MCNC: 93 MG/DL — SIGNIFICANT CHANGE UP (ref 70–99)
GLUCOSE SERPL-MCNC: 93 MG/DL — SIGNIFICANT CHANGE UP (ref 70–99)
HCT VFR BLD CALC: 35 % — LOW (ref 39–50)
HCT VFR BLD CALC: 35 % — LOW (ref 39–50)
HGB BLD-MCNC: 11.8 G/DL — LOW (ref 13–17)
HGB BLD-MCNC: 11.8 G/DL — LOW (ref 13–17)
MAGNESIUM SERPL-MCNC: 1.9 MG/DL — SIGNIFICANT CHANGE UP (ref 1.6–2.6)
MAGNESIUM SERPL-MCNC: 1.9 MG/DL — SIGNIFICANT CHANGE UP (ref 1.6–2.6)
MCHC RBC-ENTMCNC: 28.4 PG — SIGNIFICANT CHANGE UP (ref 27–34)
MCHC RBC-ENTMCNC: 28.4 PG — SIGNIFICANT CHANGE UP (ref 27–34)
MCHC RBC-ENTMCNC: 33.7 GM/DL — SIGNIFICANT CHANGE UP (ref 32–36)
MCHC RBC-ENTMCNC: 33.7 GM/DL — SIGNIFICANT CHANGE UP (ref 32–36)
MCV RBC AUTO: 84.1 FL — SIGNIFICANT CHANGE UP (ref 80–100)
MCV RBC AUTO: 84.1 FL — SIGNIFICANT CHANGE UP (ref 80–100)
NRBC # BLD: 0 /100 WBCS — SIGNIFICANT CHANGE UP (ref 0–0)
NRBC # BLD: 0 /100 WBCS — SIGNIFICANT CHANGE UP (ref 0–0)
PHOSPHATE SERPL-MCNC: 2.1 MG/DL — LOW (ref 2.5–4.5)
PHOSPHATE SERPL-MCNC: 2.1 MG/DL — LOW (ref 2.5–4.5)
PLATELET # BLD AUTO: 156 K/UL — SIGNIFICANT CHANGE UP (ref 150–400)
PLATELET # BLD AUTO: 156 K/UL — SIGNIFICANT CHANGE UP (ref 150–400)
POTASSIUM SERPL-MCNC: 3.5 MMOL/L — SIGNIFICANT CHANGE UP (ref 3.5–5.3)
POTASSIUM SERPL-MCNC: 3.5 MMOL/L — SIGNIFICANT CHANGE UP (ref 3.5–5.3)
POTASSIUM SERPL-SCNC: 3.5 MMOL/L — SIGNIFICANT CHANGE UP (ref 3.5–5.3)
POTASSIUM SERPL-SCNC: 3.5 MMOL/L — SIGNIFICANT CHANGE UP (ref 3.5–5.3)
PROT SERPL-MCNC: 6.1 G/DL — SIGNIFICANT CHANGE UP (ref 6–8.3)
PROT SERPL-MCNC: 6.1 G/DL — SIGNIFICANT CHANGE UP (ref 6–8.3)
RBC # BLD: 4.16 M/UL — LOW (ref 4.2–5.8)
RBC # BLD: 4.16 M/UL — LOW (ref 4.2–5.8)
RBC # FLD: 13.5 % — SIGNIFICANT CHANGE UP (ref 10.3–14.5)
RBC # FLD: 13.5 % — SIGNIFICANT CHANGE UP (ref 10.3–14.5)
SODIUM SERPL-SCNC: 137 MMOL/L — SIGNIFICANT CHANGE UP (ref 135–145)
SODIUM SERPL-SCNC: 137 MMOL/L — SIGNIFICANT CHANGE UP (ref 135–145)
WBC # BLD: 7.35 K/UL — SIGNIFICANT CHANGE UP (ref 3.8–10.5)
WBC # BLD: 7.35 K/UL — SIGNIFICANT CHANGE UP (ref 3.8–10.5)
WBC # FLD AUTO: 7.35 K/UL — SIGNIFICANT CHANGE UP (ref 3.8–10.5)
WBC # FLD AUTO: 7.35 K/UL — SIGNIFICANT CHANGE UP (ref 3.8–10.5)

## 2023-12-24 PROCEDURE — 99223 1ST HOSP IP/OBS HIGH 75: CPT

## 2023-12-24 RX ORDER — SODIUM,POTASSIUM PHOSPHATES 278-250MG
1 POWDER IN PACKET (EA) ORAL ONCE
Refills: 0 | Status: COMPLETED | OUTPATIENT
Start: 2023-12-24 | End: 2023-12-24

## 2023-12-24 RX ORDER — MAGNESIUM SULFATE 500 MG/ML
1 VIAL (ML) INJECTION ONCE
Refills: 0 | Status: COMPLETED | OUTPATIENT
Start: 2023-12-24 | End: 2023-12-24

## 2023-12-24 RX ORDER — POTASSIUM CHLORIDE 20 MEQ
20 PACKET (EA) ORAL ONCE
Refills: 0 | Status: COMPLETED | OUTPATIENT
Start: 2023-12-24 | End: 2023-12-24

## 2023-12-24 RX ADMIN — Medication 650 MILLIGRAM(S): at 20:28

## 2023-12-24 RX ADMIN — PIPERACILLIN AND TAZOBACTAM 25 GRAM(S): 4; .5 INJECTION, POWDER, LYOPHILIZED, FOR SOLUTION INTRAVENOUS at 22:33

## 2023-12-24 RX ADMIN — HEPARIN SODIUM 5000 UNIT(S): 5000 INJECTION INTRAVENOUS; SUBCUTANEOUS at 05:17

## 2023-12-24 RX ADMIN — Medication 1 PACKET(S): at 11:48

## 2023-12-24 RX ADMIN — HEPARIN SODIUM 5000 UNIT(S): 5000 INJECTION INTRAVENOUS; SUBCUTANEOUS at 16:19

## 2023-12-24 RX ADMIN — Medication 100 GRAM(S): at 11:52

## 2023-12-24 RX ADMIN — Medication 650 MILLIGRAM(S): at 09:34

## 2023-12-24 RX ADMIN — HEPARIN SODIUM 5000 UNIT(S): 5000 INJECTION INTRAVENOUS; SUBCUTANEOUS at 22:34

## 2023-12-24 RX ADMIN — Medication 650 MILLIGRAM(S): at 05:23

## 2023-12-24 RX ADMIN — CHLORHEXIDINE GLUCONATE 1 APPLICATION(S): 213 SOLUTION TOPICAL at 05:18

## 2023-12-24 RX ADMIN — Medication 20 MILLIEQUIVALENT(S): at 11:52

## 2023-12-24 RX ADMIN — OXYCODONE HYDROCHLORIDE 5 MILLIGRAM(S): 5 TABLET ORAL at 00:00

## 2023-12-24 RX ADMIN — Medication 650 MILLIGRAM(S): at 21:35

## 2023-12-24 RX ADMIN — PIPERACILLIN AND TAZOBACTAM 25 GRAM(S): 4; .5 INJECTION, POWDER, LYOPHILIZED, FOR SOLUTION INTRAVENOUS at 05:18

## 2023-12-24 RX ADMIN — PIPERACILLIN AND TAZOBACTAM 25 GRAM(S): 4; .5 INJECTION, POWDER, LYOPHILIZED, FOR SOLUTION INTRAVENOUS at 14:12

## 2023-12-24 RX ADMIN — Medication 650 MILLIGRAM(S): at 06:00

## 2023-12-24 NOTE — CONSULT NOTE ADULT - ASSESSMENT
43M PMH DM2 on metformin, Invokana sent in by his GI for acute onset RUQ pain, admitted for acute migue now 12/22 with lysis of adhesions in RUQ, gengrenous-appearing GB vs abscess, drain left in place. Course c/b euglycemic dka vs starvation ketosis--resolved. Doing well     #acute migue now 12/22 with lysis of adhesions in RUQ, gangrenous -appearing GB vs abscess  -C/w Zosyn   -Usual postop care; is voiding, having BMs, walking  -Requesting diet be advanced   -drain managed by Team     # euglycemic dka vs starvation ketosis--resolved.   At admission had BHB 2.6, pH 7.33, pCO 38, Glu 150-160. Lactate was 8. HCO3 21. Briefly treated with insulin gtt due to suspicion for euglycemia dka (was only able to hold invokana one day). Suspect more sepsis/starvaton ketosis picture.  FSG were higher 12/23--likely 2/2 stress response. Tolerated clears, has not needed SS correction today, Hemoglobin A1c  8.6  -MISS today  -Can continue taking his metformin at discharge. Has a FSG monitor at home. Can likely resume his invokana but would defer that decision to outpt Endo.

## 2023-12-24 NOTE — PROGRESS NOTE ADULT - SUBJECTIVE AND OBJECTIVE BOX
INTERVAL HPI/OVERNIGHT EVENTS:   SURGERY ATTENDING    STATUS POST:  Robotic cholecystectomy, Lysis of adhesions, evacuation of Right Upper quadrant abscess, washout, Drainage.          POST OPERATIVE DAY #: 2    SUBJECTIVE:  Flatus: [x ] YES [ ] NO             Bowel Movement: [ ] YES [x ] NO  Pain (0-10):    3        Pain Control Adequate: [x ] YES [ ] NO  Nausea: [ ] YES [x ] NO            Vomiting: [ ] YES [x ] NO  Diarrhea: [ ] YES [x ] NO         Constipation: [ ] YES [x ] NO     Chest Pain: [ ] YES [x ] NO    SOB:  [ ] YES [x ] NO    MEDICATIONS  (STANDING):  chlorhexidine 2% Cloths 1 Application(s) Topical <User Schedule>  dextrose 5%. 1000 milliLiter(s) (50 mL/Hr) IV Continuous <Continuous>  dextrose 5%. 1000 milliLiter(s) (100 mL/Hr) IV Continuous <Continuous>  dextrose 50% Injectable 25 Gram(s) IV Push once  dextrose 50% Injectable 50 milliLiter(s) IV Push every 15 minutes  dextrose 50% Injectable 25 Gram(s) IV Push once  dextrose 50% Injectable 12.5 Gram(s) IV Push once  glucagon  Injectable 1 milliGRAM(s) IntraMuscular once  heparin   Injectable 5000 Unit(s) SubCutaneous every 8 hours  insulin lispro (ADMELOG) corrective regimen sliding scale   SubCutaneous three times a day before meals  piperacillin/tazobactam IVPB.. 3.375 Gram(s) IV Intermittent every 8 hours    MEDICATIONS  (PRN):  acetaminophen     Tablet .. 650 milliGRAM(s) Oral every 6 hours PRN Mild Pain (1 - 3), Moderate Pain (4 - 6)  dextrose Oral Gel 15 Gram(s) Oral once PRN Blood Glucose LESS THAN 70 milliGRAM(s)/deciliter  ondansetron Injectable 4 milliGRAM(s) IV Push every 6 hours PRN Nausea  oxyCODONE    IR 5 milliGRAM(s) Oral every 6 hours PRN Severe Pain (7 - 10)      Vital Signs Last 24 Hrs  T(C): 37.3 (24 Dec 2023 05:21), Max: 37.7 (23 Dec 2023 22:33)  T(F): 99.2 (24 Dec 2023 05:21), Max: 99.9 (23 Dec 2023 22:33)  HR: 94 (24 Dec 2023 03:10) (82 - 102)  BP: 103/63 (24 Dec 2023 03:10) (95/58 - 124/76)  BP(mean): 76 (24 Dec 2023 03:10) (69 - 93)  RR: 17 (24 Dec 2023 03:10) (12 - 21)  SpO2: 98% (24 Dec 2023 03:10) (97% - 100%)    Parameters below as of 24 Dec 2023 03:10  Patient On (Oxygen Delivery Method): room air        PHYSICAL EXAM:      Constitutional:    Eyes:    ENMT:    Neck:    Breasts:    Back:    Respiratory:    Cardiovascular:    Gastrointestinal:    Genitourinary:    Rectal:    Extremities:    Vascular:    Neurological:    Skin:    Lymph Nodes:    Musculoskeletal:    Psychiatric:        I&O's Detail    23 Dec 2023 07:01  -  24 Dec 2023 07:00  --------------------------------------------------------  IN:    dextrose 5% + sodium chloride 0.9%: 110 mL    Insulin: 2 mL    IV PiggyBack: 100 mL    IV PiggyBack: 162.5 mL    IV PiggyBack: 100 mL    Oral Fluid: 350 mL  Total IN: 824.5 mL    OUT:    Bulb (mL): 23 mL    Indwelling Catheter - Urethral (mL): 2725 mL    Voided (mL): 0 mL  Total OUT: 2748 mL    Total NET: -1923.5 mL          LABS:                        11.8   7.35  )-----------( 156      ( 24 Dec 2023 06:32 )             35.0     12-24    137  |  102  |  10  ----------------------------<  93  3.5   |  26  |  0.78    Ca    8.4      24 Dec 2023 06:32  Phos  2.1     12-24  Mg     1.9     12-24    TPro  6.1  /  Alb  3.1<L>  /  TBili  1.3<H>  /  DBili  0.2  /  AST  41<H>  /  ALT  46<H>  /  AlkPhos  47  12-24    PT/INR - ( 22 Dec 2023 14:42 )   PT: 14.4 sec;   INR: 1.27          PTT - ( 22 Dec 2023 14:42 )  PTT:29.5 sec  Urinalysis Basic - ( 24 Dec 2023 06:32 )    Color: x / Appearance: x / SG: x / pH: x  Gluc: 93 mg/dL / Ketone: x  / Bili: x / Urobili: x   Blood: x / Protein: x / Nitrite: x   Leuk Esterase: x / RBC: x / WBC x   Sq Epi: x / Non Sq Epi: x / Bacteria: x        RADIOLOGY & ADDITIONAL STUDIES:

## 2023-12-24 NOTE — PROGRESS NOTE ADULT - ASSESSMENT
43M with PMHx of DMII and no PSHx who presents to [facility] for evaluation of RUQ abdominal pain. Admitted for gangrenous cholecystitis now s/p lap migue (12/22). Course c/b euglycemic DKA, in SICU for insulin gtt. SDU 12/24    CC FLD  Pain and nausea prn  SSI/Holding Metformin and Canagliflozin.   Zosyn (12/22--)   AIDEN x1   SQH/SCDs/OOBA   43M with PMHx of DMII and no PSHx who presents to [facility] for evaluation of RUQ abdominal pain. Admitted for gangrenous cholecystitis now s/p lap migue (12/22). Course c/b euglycemic DKA, in SICU for insulin gtt. SDU 12/24    CC FLD  Pain and nausea prn  SSI/Holding Metformin and Canagliflozin.   Zosyn (12/22--)   AIDEN x1   SQH/SCDs/OOBA

## 2023-12-24 NOTE — PROGRESS NOTE ADULT - SUBJECTIVE AND OBJECTIVE BOX
12/24: diet to FLD, pain controled, -n/v, Tolerating diet. +f/bms.    ON: KEVIN    POST-OP DAY: X s/p      SUBJECTIVE: Patient seen and examined bedside by Surgical resident. Up in bed, +PO tolerance w/ CLD, -n/v, pain controlled.     heparin   Injectable 5000 Unit(s) SubCutaneous every 8 hours  piperacillin/tazobactam IVPB.. 3.375 Gram(s) IV Intermittent every 8 hours    MEDICATIONS  (PRN):  acetaminophen     Tablet .. 650 milliGRAM(s) Oral every 6 hours PRN Mild Pain (1 - 3), Moderate Pain (4 - 6)  dextrose Oral Gel 15 Gram(s) Oral once PRN Blood Glucose LESS THAN 70 milliGRAM(s)/deciliter  ondansetron Injectable 4 milliGRAM(s) IV Push every 6 hours PRN Nausea  oxyCODONE    IR 5 milliGRAM(s) Oral every 6 hours PRN Severe Pain (7 - 10)      I&O's Detail    23 Dec 2023 07:01  -  24 Dec 2023 07:00  --------------------------------------------------------  IN:    dextrose 5% + sodium chloride 0.9%: 110 mL    Insulin: 2 mL    IV PiggyBack: 100 mL    IV PiggyBack: 162.5 mL    IV PiggyBack: 100 mL    Oral Fluid: 350 mL  Total IN: 824.5 mL    OUT:    Bulb (mL): 23 mL    Indwelling Catheter - Urethral (mL): 2725 mL    Voided (mL): 0 mL  Total OUT: 2748 mL    Total NET: -1923.5 mL      24 Dec 2023 07:01  -  24 Dec 2023 18:51  --------------------------------------------------------  IN:    Oral Fluid: 390 mL  Total IN: 390 mL    OUT:    Bulb (mL): 25 mL    Voided (mL): 700 mL  Total OUT: 725 mL    Total NET: -335 mL          Vital Signs Last 24 Hrs  T(C): 36.6 (24 Dec 2023 17:26), Max: 37.7 (23 Dec 2023 22:33)  T(F): 97.9 (24 Dec 2023 17:26), Max: 99.9 (23 Dec 2023 22:33)  HR: 78 (24 Dec 2023 15:26) (78 - 102)  BP: 126/77 (24 Dec 2023 15:26) (103/63 - 126/77)  BP(mean): 96 (24 Dec 2023 15:26) (76 - 96)  RR: 18 (24 Dec 2023 15:26) (17 - 19)  SpO2: 99% (24 Dec 2023 15:26) (97% - 99%)    Parameters below as of 24 Dec 2023 15:26  Patient On (Oxygen Delivery Method): room air        General: NAD, resting comfortably in bed  C/V: NSR  Pulm: Nonlabored breathing, no respiratory distress  Abd: soft, ttp @ right sided drain placement /ND, JPx1  SS,   Extrem: WWP, no edema, SCDs in place    LABS:                        11.8   7.35  )-----------( 156      ( 24 Dec 2023 06:32 )             35.0     12-24    137  |  102  |  10  ----------------------------<  93  3.5   |  26  |  0.78    Ca    8.4      24 Dec 2023 06:32  Phos  2.1     12-24  Mg     1.9     12-24    TPro  6.1  /  Alb  3.1<L>  /  TBili  1.3<H>  /  DBili  0.2  /  AST  41<H>  /  ALT  46<H>  /  AlkPhos  47  12-24      Urinalysis Basic - ( 24 Dec 2023 06:32 )    Color: x / Appearance: x / SG: x / pH: x  Gluc: 93 mg/dL / Ketone: x  / Bili: x / Urobili: x   Blood: x / Protein: x / Nitrite: x   Leuk Esterase: x / RBC: x / WBC x   Sq Epi: x / Non Sq Epi: x / Bacteria: x        RADIOLOGY & ADDITIONAL STUDIES:     12/24: diet to FLD, pain controled, -n/v, Tolerating diet. +f/bms.    ON: KEVNI    POST-OP DAY: X s/p      SUBJECTIVE: Patient seen and examined bedside by Surgical resident. Up in bed, +PO tolerance w/ CLD, -n/v, pain controlled.     heparin   Injectable 5000 Unit(s) SubCutaneous every 8 hours  piperacillin/tazobactam IVPB.. 3.375 Gram(s) IV Intermittent every 8 hours    MEDICATIONS  (PRN):  acetaminophen     Tablet .. 650 milliGRAM(s) Oral every 6 hours PRN Mild Pain (1 - 3), Moderate Pain (4 - 6)  dextrose Oral Gel 15 Gram(s) Oral once PRN Blood Glucose LESS THAN 70 milliGRAM(s)/deciliter  ondansetron Injectable 4 milliGRAM(s) IV Push every 6 hours PRN Nausea  oxyCODONE    IR 5 milliGRAM(s) Oral every 6 hours PRN Severe Pain (7 - 10)      I&O's Detail    23 Dec 2023 07:01  -  24 Dec 2023 07:00  --------------------------------------------------------  IN:    dextrose 5% + sodium chloride 0.9%: 110 mL    Insulin: 2 mL    IV PiggyBack: 100 mL    IV PiggyBack: 162.5 mL    IV PiggyBack: 100 mL    Oral Fluid: 350 mL  Total IN: 824.5 mL    OUT:    Bulb (mL): 23 mL    Indwelling Catheter - Urethral (mL): 2725 mL    Voided (mL): 0 mL  Total OUT: 2748 mL    Total NET: -1923.5 mL      24 Dec 2023 07:01  -  24 Dec 2023 18:51  --------------------------------------------------------  IN:    Oral Fluid: 390 mL  Total IN: 390 mL    OUT:    Bulb (mL): 25 mL    Voided (mL): 700 mL  Total OUT: 725 mL    Total NET: -335 mL          Vital Signs Last 24 Hrs  T(C): 36.6 (24 Dec 2023 17:26), Max: 37.7 (23 Dec 2023 22:33)  T(F): 97.9 (24 Dec 2023 17:26), Max: 99.9 (23 Dec 2023 22:33)  HR: 78 (24 Dec 2023 15:26) (78 - 102)  BP: 126/77 (24 Dec 2023 15:26) (103/63 - 126/77)  BP(mean): 96 (24 Dec 2023 15:26) (76 - 96)  RR: 18 (24 Dec 2023 15:26) (17 - 19)  SpO2: 99% (24 Dec 2023 15:26) (97% - 99%)    Parameters below as of 24 Dec 2023 15:26  Patient On (Oxygen Delivery Method): room air        General: NAD, resting comfortably in bed  C/V: NSR  Pulm: Nonlabored breathing, no respiratory distress  Abd: soft, ttp @ right sided drain placement /ND, JPx1  SS,   Extrem: WWP, no edema, SCDs in place    LABS:                        11.8   7.35  )-----------( 156      ( 24 Dec 2023 06:32 )             35.0     12-24    137  |  102  |  10  ----------------------------<  93  3.5   |  26  |  0.78    Ca    8.4      24 Dec 2023 06:32  Phos  2.1     12-24  Mg     1.9     12-24    TPro  6.1  /  Alb  3.1<L>  /  TBili  1.3<H>  /  DBili  0.2  /  AST  41<H>  /  ALT  46<H>  /  AlkPhos  47  12-24      Urinalysis Basic - ( 24 Dec 2023 06:32 )    Color: x / Appearance: x / SG: x / pH: x  Gluc: 93 mg/dL / Ketone: x  / Bili: x / Urobili: x   Blood: x / Protein: x / Nitrite: x   Leuk Esterase: x / RBC: x / WBC x   Sq Epi: x / Non Sq Epi: x / Bacteria: x        RADIOLOGY & ADDITIONAL STUDIES:

## 2023-12-24 NOTE — CONSULT NOTE ADULT - SUBJECTIVE AND OBJECTIVE BOX
Patient is a 43y old  Male who presents with a chief complaint of abdominal pain (24 Dec 2023 08:19)    Internal Medicine New Consult Note: Co-management   43M PMH DM2 on metformin, Invokana presented for acute onset RUQ pain, initially had CT at [facility] wiht mild gallbladder wall thickening, followed up with his GI who sent him in for r/o cholecystitis. RUQ US here with thickened GB wall, pericholecystic fluid, +murphys, admitted for acute migue, now s/p robot lap migue 12/22 with lysis of adhesions in RUQ, gengrenous-appearing GB vs abscess, drain left in place. Course c/b euglycemic dka vs starvation ketosis: received insulin gtt 2u/hr x10hr under SICU. on 10/23: received 10u NPH in AM.     Pt seen at bedside, had tolerated clears 24hr, voiding, having BMs.  Reports was not feeling particularly ill post-op. Now just tired.     Nonsmoker       HPI:  Patient is a 43M with PMHx of DMII and no PSHx who presents to [facility] for evaluation of epigsatric and RUQ abdominal pain. Patient reports pain started Wednesday evening at 11PM without clear inciting factors. States pain was a sharp, punching pain in epigastrium that radiates to the back. Denied any additional symptoms including fevers, chills, chest pain, SOB, nausea, emesis. Reports similar pain to this unrelated to PO intake that self resolves within a few minutes, but states pain was not improving so he wanted to [facility] for evaluation. Patient underwent U/S, CT at [facility] which revealed non-dilated gallbladder without cholelithiasis and CT showed mild GBW without other acute pathology and he was discharged on Augmentin.   Patient believed pain may have been gastritis and was evaluated by outpatient GI this AM who sent him to [facility] for evaluation of cholecystitis. Patient reports pain is similar and currently 5/10, but now in the RUQ as well and still radiating to the back.    Medical History: DMII  Surgical History: Denies  Medications: Metformin 1000 BID, Invokana daily  Allergies: Denies  Social History: Denies tobacco use. Admits to social alcohol use. 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    In the ED, patient afebrile, nontoxic appearing:  - VITALS: Afebrile 98.2 F,  - sinus, /92, saturating well on RA  - LABORAORY: WBC 14.9K, Hb 15.4, Tbili 2.3 - Direct 0.3  - RUQ U/S with thickening GB wall, pericholecystic fluid and +Morrow's sign   (22 Dec 2023 16:21)      PAST MEDICAL & SURGICAL HISTORY:  Diabetes          FAMILY HISTORY:      SOCIAL HISTORY:  Smoking Status: [ ] Current, [ ] Former, [ ] Never  Pack Years:    MEDICATIONS:  Pulmonary:    Antimicrobials:  piperacillin/tazobactam IVPB.. 3.375 Gram(s) IV Intermittent every 8 hours    Anticoagulants:  heparin   Injectable 5000 Unit(s) SubCutaneous every 8 hours    Onc:    GI/:    Endocrine:  dextrose 50% Injectable 25 Gram(s) IV Push once  dextrose 50% Injectable 25 Gram(s) IV Push once  dextrose 50% Injectable 12.5 Gram(s) IV Push once  dextrose 50% Injectable 50 milliLiter(s) IV Push every 15 minutes  dextrose Oral Gel 15 Gram(s) Oral once PRN  glucagon  Injectable 1 milliGRAM(s) IntraMuscular once  insulin lispro (ADMELOG) corrective regimen sliding scale   SubCutaneous three times a day before meals    Cardiac:    Other Medications:  acetaminophen     Tablet .. 650 milliGRAM(s) Oral every 6 hours PRN  chlorhexidine 2% Cloths 1 Application(s) Topical <User Schedule>  dextrose 5%. 1000 milliLiter(s) IV Continuous <Continuous>  dextrose 5%. 1000 milliLiter(s) IV Continuous <Continuous>  dextrose 50% Injectable 25 Gram(s) IV Push once  dextrose 50% Injectable 50 milliLiter(s) IV Push every 15 minutes  dextrose 50% Injectable 25 Gram(s) IV Push once  dextrose 50% Injectable 12.5 Gram(s) IV Push once  dextrose Oral Gel 15 Gram(s) Oral once PRN  glucagon  Injectable 1 milliGRAM(s) IntraMuscular once  insulin lispro (ADMELOG) corrective regimen sliding scale   SubCutaneous three times a day before meals  ondansetron Injectable 4 milliGRAM(s) IV Push every 6 hours PRN  oxyCODONE    IR 5 milliGRAM(s) Oral every 6 hours PRN      Allergies    No Known Allergies    Intolerances        Vital Signs Last 24 Hrs  T(C): 36.3 (24 Dec 2023 09:09), Max: 37.7 (23 Dec 2023 22:33)  T(F): 97.4 (24 Dec 2023 09:09), Max: 99.9 (23 Dec 2023 22:33)  HR: 86 (24 Dec 2023 11:50) (85 - 102)  BP: 108/71 (24 Dec 2023 11:50) (103/62 - 119/75)  BP(mean): 84 (24 Dec 2023 11:50) (76 - 93)  RR: 19 (24 Dec 2023 11:50) (15 - 21)  SpO2: 99% (24 Dec 2023 11:50) (97% - 100%)    Parameters below as of 24 Dec 2023 11:50  Patient On (Oxygen Delivery Method): room air        12-23 @ 07:01  -  12-24 @ 07:00  --------------------------------------------------------  IN: 824.5 mL / OUT: 2748 mL / NET: -1923.5 mL    12-24 @ 07:01  -  12-24 @ 12:12  --------------------------------------------------------  IN: 240 mL / OUT: 725 mL / NET: -485 mL    EXAM  Gen: WDWD young man in chair   HEENT: MMM  PULM: CTA   CARDIO RRR   GI: well healing port scars, drain with serosanguinous fuid. +BS, slight ttp   Ext: no edema, wwp       LABS:  ABG - ( 22 Dec 2023 23:41 )  pH, Arterial: 7.34  pH, Blood: x     /  pCO2: 39    /  pO2: 135   / HCO3: 21    / Base Excess: -4.4  /  SaO2: 98.8                CBC Full  -  ( 24 Dec 2023 06:32 )  WBC Count : 7.35 K/uL  RBC Count : 4.16 M/uL  Hemoglobin : 11.8 g/dL  Hematocrit : 35.0 %  Platelet Count - Automated : 156 K/uL  Mean Cell Volume : 84.1 fl  Mean Cell Hemoglobin : 28.4 pg  Mean Cell Hemoglobin Concentration : 33.7 gm/dL  Auto Neutrophil # : x  Auto Lymphocyte # : x  Auto Monocyte # : x  Auto Eosinophil # : x  Auto Basophil # : x  Auto Neutrophil % : x  Auto Lymphocyte % : x  Auto Monocyte % : x  Auto Eosinophil % : x  Auto Basophil % : x    12-24    137  |  102  |  10  ----------------------------<  93  3.5   |  26  |  0.78    Ca    8.4      24 Dec 2023 06:32  Phos  2.1     12-24  Mg     1.9     12-24    TPro  6.1  /  Alb  3.1<L>  /  TBili  1.3<H>  /  DBili  0.2  /  AST  41<H>  /  ALT  46<H>  /  AlkPhos  47  12-24    PT/INR - ( 22 Dec 2023 14:42 )   PT: 14.4 sec;   INR: 1.27          PTT - ( 22 Dec 2023 14:42 )  PTT:29.5 sec      Urinalysis Basic - ( 24 Dec 2023 06:32 )    Color: x / Appearance: x / SG: x / pH: x  Gluc: 93 mg/dL / Ketone: x  / Bili: x / Urobili: x   Blood: x / Protein: x / Nitrite: x   Leuk Esterase: x / RBC: x / WBC x   Sq Epi: x / Non Sq Epi: x / Bacteria: x                RADIOLOGY & ADDITIONAL STUDIES (The following images were personally reviewed): Patient is a 43y old  Male who presents with a chief complaint of abdominal pain (24 Dec 2023 08:19)    Internal Medicine New Consult Note: Co-management   43M PMH DM2 on metformin, Invokana presented for acute onset RUQ pain, initially had CT at [facility] wiht mild gallbladder wall thickening, followed up with his GI who sent him in for r/o cholecystitis. RUQ US here with thickened GB wall, pericholecystic fluid, +murphys, admitted for acute migue, now s/p robot lap migue 12/22 with lysis of adhesions in RUQ, gengrenous-appearing GB vs abscess, drain left in place. Course c/b euglycemic dka vs starvation ketosis: received insulin gtt 2u/hr x10hr under SICU. on 10/23: received 10u NPH in AM.     Pt seen at bedside, had tolerated clears 24hr, voiding, having BMs.  Reports was not feeling particularly ill post-op. Now just tired.     Nonsmoker       HPI:  Patient is a 43M with PMHx of DMII and no PSHx who presents to [facility] for evaluation of epigsatric and RUQ abdominal pain. Patient reports pain started Wednesday evening at 11PM without clear inciting factors. States pain was a sharp, punching pain in epigastrium that radiates to the back. Denied any additional symptoms including fevers, chills, chest pain, SOB, nausea, emesis. Reports similar pain to this unrelated to PO intake that self resolves within a few minutes, but states pain was not improving so he wanted to [facility] for evaluation. Patient underwent U/S, CT at [facility] which revealed non-dilated gallbladder without cholelithiasis and CT showed mild GBW without other acute pathology and he was discharged on Augmentin.   Patient believed pain may have been gastritis and was evaluated by outpatient GI this AM who sent him to [facility] for evaluation of cholecystitis. Patient reports pain is similar and currently 5/10, but now in the RUQ as well and still radiating to the back.    Medical History: DMII  Surgical History: Denies  Medications: Metformin 1000 BID, Invokana daily  Allergies: Denies  Social History: Denies tobacco use. Admits to social alcohol use. 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    In the ED, patient afebrile, nontoxic appearing:  - VITALS: Afebrile 98.2 F,  - sinus, /92, saturating well on RA  - LABORAORY: WBC 14.9K, Hb 15.4, Tbili 2.3 - Direct 0.3  - RUQ U/S with thickening GB wall, pericholecystic fluid and +Morrow's sign   (22 Dec 2023 16:21)      PAST MEDICAL & SURGICAL HISTORY:  Diabetes          FAMILY HISTORY:      SOCIAL HISTORY:  Smoking Status: [ ] Current, [ ] Former, [ ] Never  Pack Years:    MEDICATIONS:  Pulmonary:    Antimicrobials:  piperacillin/tazobactam IVPB.. 3.375 Gram(s) IV Intermittent every 8 hours    Anticoagulants:  heparin   Injectable 5000 Unit(s) SubCutaneous every 8 hours    Onc:    GI/:    Endocrine:  dextrose 50% Injectable 25 Gram(s) IV Push once  dextrose 50% Injectable 25 Gram(s) IV Push once  dextrose 50% Injectable 12.5 Gram(s) IV Push once  dextrose 50% Injectable 50 milliLiter(s) IV Push every 15 minutes  dextrose Oral Gel 15 Gram(s) Oral once PRN  glucagon  Injectable 1 milliGRAM(s) IntraMuscular once  insulin lispro (ADMELOG) corrective regimen sliding scale   SubCutaneous three times a day before meals    Cardiac:    Other Medications:  acetaminophen     Tablet .. 650 milliGRAM(s) Oral every 6 hours PRN  chlorhexidine 2% Cloths 1 Application(s) Topical <User Schedule>  dextrose 5%. 1000 milliLiter(s) IV Continuous <Continuous>  dextrose 5%. 1000 milliLiter(s) IV Continuous <Continuous>  dextrose 50% Injectable 25 Gram(s) IV Push once  dextrose 50% Injectable 50 milliLiter(s) IV Push every 15 minutes  dextrose 50% Injectable 25 Gram(s) IV Push once  dextrose 50% Injectable 12.5 Gram(s) IV Push once  dextrose Oral Gel 15 Gram(s) Oral once PRN  glucagon  Injectable 1 milliGRAM(s) IntraMuscular once  insulin lispro (ADMELOG) corrective regimen sliding scale   SubCutaneous three times a day before meals  ondansetron Injectable 4 milliGRAM(s) IV Push every 6 hours PRN  oxyCODONE    IR 5 milliGRAM(s) Oral every 6 hours PRN      Allergies    No Known Allergies    Intolerances        Vital Signs Last 24 Hrs  T(C): 36.3 (24 Dec 2023 09:09), Max: 37.7 (23 Dec 2023 22:33)  T(F): 97.4 (24 Dec 2023 09:09), Max: 99.9 (23 Dec 2023 22:33)  HR: 86 (24 Dec 2023 11:50) (85 - 102)  BP: 108/71 (24 Dec 2023 11:50) (103/62 - 119/75)  BP(mean): 84 (24 Dec 2023 11:50) (76 - 93)  RR: 19 (24 Dec 2023 11:50) (15 - 21)  SpO2: 99% (24 Dec 2023 11:50) (97% - 100%)    Parameters below as of 24 Dec 2023 11:50  Patient On (Oxygen Delivery Method): room air        12-23 @ 07:01  -  12-24 @ 07:00  --------------------------------------------------------  IN: 824.5 mL / OUT: 2748 mL / NET: -1923.5 mL    12-24 @ 07:01  -  12-24 @ 12:12  --------------------------------------------------------  IN: 240 mL / OUT: 725 mL / NET: -485 mL    EXAM  Gen: WDWD young man in chair   HEENT: MMM  PULM: CTA   CARDIO RRR   GI: well healing port scars, drain with serosanguinous fuid. +BS, slight ttp   Ext: no edema, wwp       LABS:  ABG - ( 22 Dec 2023 23:41 )  pH, Arterial: 7.34  pH, Blood: x     /  pCO2: 39    /  pO2: 135   / HCO3: 21    / Base Excess: -4.4  /  SaO2: 98.8                CBC Full  -  ( 24 Dec 2023 06:32 )  WBC Count : 7.35 K/uL  RBC Count : 4.16 M/uL  Hemoglobin : 11.8 g/dL  Hematocrit : 35.0 %  Platelet Count - Automated : 156 K/uL  Mean Cell Volume : 84.1 fl  Mean Cell Hemoglobin : 28.4 pg  Mean Cell Hemoglobin Concentration : 33.7 gm/dL  Auto Neutrophil # : x  Auto Lymphocyte # : x  Auto Monocyte # : x  Auto Eosinophil # : x  Auto Basophil # : x  Auto Neutrophil % : x  Auto Lymphocyte % : x  Auto Monocyte % : x  Auto Eosinophil % : x  Auto Basophil % : x    12-24    137  |  102  |  10  ----------------------------<  93  3.5   |  26  |  0.78    Ca    8.4      24 Dec 2023 06:32  Phos  2.1     12-24  Mg     1.9     12-24    TPro  6.1  /  Alb  3.1<L>  /  TBili  1.3<H>  /  DBili  0.2  /  AST  41<H>  /  ALT  46<H>  /  AlkPhos  47  12-24    PT/INR - ( 22 Dec 2023 14:42 )   PT: 14.4 sec;   INR: 1.27          PTT - ( 22 Dec 2023 14:42 )  PTT:29.5 sec      Urinalysis Basic - ( 24 Dec 2023 06:32 )    Color: x / Appearance: x / SG: x / pH: x  Gluc: 93 mg/dL / Ketone: x  / Bili: x / Urobili: x   Blood: x / Protein: x / Nitrite: x   Leuk Esterase: x / RBC: x / WBC x   Sq Epi: x / Non Sq Epi: x / Bacteria: x                RADIOLOGY & ADDITIONAL STUDIES (The following images were personally reviewed):

## 2023-12-25 ENCOUNTER — TRANSCRIPTION ENCOUNTER (OUTPATIENT)
Age: 43
End: 2023-12-25

## 2023-12-25 VITALS — TEMPERATURE: 98 F

## 2023-12-25 LAB
ALBUMIN SERPL ELPH-MCNC: 3.2 G/DL — LOW (ref 3.3–5)
ALBUMIN SERPL ELPH-MCNC: 3.2 G/DL — LOW (ref 3.3–5)
ALP SERPL-CCNC: 46 U/L — SIGNIFICANT CHANGE UP (ref 40–120)
ALP SERPL-CCNC: 46 U/L — SIGNIFICANT CHANGE UP (ref 40–120)
ALT FLD-CCNC: 42 U/L — SIGNIFICANT CHANGE UP (ref 10–45)
ALT FLD-CCNC: 42 U/L — SIGNIFICANT CHANGE UP (ref 10–45)
ANION GAP SERPL CALC-SCNC: 7 MMOL/L — SIGNIFICANT CHANGE UP (ref 5–17)
ANION GAP SERPL CALC-SCNC: 7 MMOL/L — SIGNIFICANT CHANGE UP (ref 5–17)
AST SERPL-CCNC: 27 U/L — SIGNIFICANT CHANGE UP (ref 10–40)
AST SERPL-CCNC: 27 U/L — SIGNIFICANT CHANGE UP (ref 10–40)
BILIRUB DIRECT SERPL-MCNC: <0.2 MG/DL — SIGNIFICANT CHANGE UP (ref 0–0.3)
BILIRUB DIRECT SERPL-MCNC: <0.2 MG/DL — SIGNIFICANT CHANGE UP (ref 0–0.3)
BILIRUB SERPL-MCNC: 0.9 MG/DL — SIGNIFICANT CHANGE UP (ref 0.2–1.2)
BILIRUB SERPL-MCNC: 0.9 MG/DL — SIGNIFICANT CHANGE UP (ref 0.2–1.2)
BUN SERPL-MCNC: 10 MG/DL — SIGNIFICANT CHANGE UP (ref 7–23)
BUN SERPL-MCNC: 10 MG/DL — SIGNIFICANT CHANGE UP (ref 7–23)
CALCIUM SERPL-MCNC: 8.8 MG/DL — SIGNIFICANT CHANGE UP (ref 8.4–10.5)
CALCIUM SERPL-MCNC: 8.8 MG/DL — SIGNIFICANT CHANGE UP (ref 8.4–10.5)
CHLORIDE SERPL-SCNC: 104 MMOL/L — SIGNIFICANT CHANGE UP (ref 96–108)
CHLORIDE SERPL-SCNC: 104 MMOL/L — SIGNIFICANT CHANGE UP (ref 96–108)
CO2 SERPL-SCNC: 28 MMOL/L — SIGNIFICANT CHANGE UP (ref 22–31)
CO2 SERPL-SCNC: 28 MMOL/L — SIGNIFICANT CHANGE UP (ref 22–31)
CREAT SERPL-MCNC: 0.77 MG/DL — SIGNIFICANT CHANGE UP (ref 0.5–1.3)
CREAT SERPL-MCNC: 0.77 MG/DL — SIGNIFICANT CHANGE UP (ref 0.5–1.3)
EGFR: 115 ML/MIN/1.73M2 — SIGNIFICANT CHANGE UP
EGFR: 115 ML/MIN/1.73M2 — SIGNIFICANT CHANGE UP
GLUCOSE BLDC GLUCOMTR-MCNC: 113 MG/DL — HIGH (ref 70–99)
GLUCOSE BLDC GLUCOMTR-MCNC: 113 MG/DL — HIGH (ref 70–99)
GLUCOSE SERPL-MCNC: 121 MG/DL — HIGH (ref 70–99)
GLUCOSE SERPL-MCNC: 121 MG/DL — HIGH (ref 70–99)
HCT VFR BLD CALC: 35.1 % — LOW (ref 39–50)
HCT VFR BLD CALC: 35.1 % — LOW (ref 39–50)
HGB BLD-MCNC: 11.6 G/DL — LOW (ref 13–17)
HGB BLD-MCNC: 11.6 G/DL — LOW (ref 13–17)
MAGNESIUM SERPL-MCNC: 1.9 MG/DL — SIGNIFICANT CHANGE UP (ref 1.6–2.6)
MAGNESIUM SERPL-MCNC: 1.9 MG/DL — SIGNIFICANT CHANGE UP (ref 1.6–2.6)
MCHC RBC-ENTMCNC: 27.8 PG — SIGNIFICANT CHANGE UP (ref 27–34)
MCHC RBC-ENTMCNC: 27.8 PG — SIGNIFICANT CHANGE UP (ref 27–34)
MCHC RBC-ENTMCNC: 33 GM/DL — SIGNIFICANT CHANGE UP (ref 32–36)
MCHC RBC-ENTMCNC: 33 GM/DL — SIGNIFICANT CHANGE UP (ref 32–36)
MCV RBC AUTO: 84 FL — SIGNIFICANT CHANGE UP (ref 80–100)
MCV RBC AUTO: 84 FL — SIGNIFICANT CHANGE UP (ref 80–100)
NRBC # BLD: 0 /100 WBCS — SIGNIFICANT CHANGE UP (ref 0–0)
NRBC # BLD: 0 /100 WBCS — SIGNIFICANT CHANGE UP (ref 0–0)
PHOSPHATE SERPL-MCNC: 2.6 MG/DL — SIGNIFICANT CHANGE UP (ref 2.5–4.5)
PHOSPHATE SERPL-MCNC: 2.6 MG/DL — SIGNIFICANT CHANGE UP (ref 2.5–4.5)
PLATELET # BLD AUTO: 180 K/UL — SIGNIFICANT CHANGE UP (ref 150–400)
PLATELET # BLD AUTO: 180 K/UL — SIGNIFICANT CHANGE UP (ref 150–400)
POTASSIUM SERPL-MCNC: 3.7 MMOL/L — SIGNIFICANT CHANGE UP (ref 3.5–5.3)
POTASSIUM SERPL-MCNC: 3.7 MMOL/L — SIGNIFICANT CHANGE UP (ref 3.5–5.3)
POTASSIUM SERPL-SCNC: 3.7 MMOL/L — SIGNIFICANT CHANGE UP (ref 3.5–5.3)
POTASSIUM SERPL-SCNC: 3.7 MMOL/L — SIGNIFICANT CHANGE UP (ref 3.5–5.3)
PROT SERPL-MCNC: 6.5 G/DL — SIGNIFICANT CHANGE UP (ref 6–8.3)
PROT SERPL-MCNC: 6.5 G/DL — SIGNIFICANT CHANGE UP (ref 6–8.3)
RBC # BLD: 4.18 M/UL — LOW (ref 4.2–5.8)
RBC # BLD: 4.18 M/UL — LOW (ref 4.2–5.8)
RBC # FLD: 13.3 % — SIGNIFICANT CHANGE UP (ref 10.3–14.5)
RBC # FLD: 13.3 % — SIGNIFICANT CHANGE UP (ref 10.3–14.5)
SODIUM SERPL-SCNC: 139 MMOL/L — SIGNIFICANT CHANGE UP (ref 135–145)
SODIUM SERPL-SCNC: 139 MMOL/L — SIGNIFICANT CHANGE UP (ref 135–145)
WBC # BLD: 5.66 K/UL — SIGNIFICANT CHANGE UP (ref 3.8–10.5)
WBC # BLD: 5.66 K/UL — SIGNIFICANT CHANGE UP (ref 3.8–10.5)
WBC # FLD AUTO: 5.66 K/UL — SIGNIFICANT CHANGE UP (ref 3.8–10.5)
WBC # FLD AUTO: 5.66 K/UL — SIGNIFICANT CHANGE UP (ref 3.8–10.5)

## 2023-12-25 PROCEDURE — C9399: CPT

## 2023-12-25 PROCEDURE — 86900 BLOOD TYPING SEROLOGIC ABO: CPT

## 2023-12-25 PROCEDURE — 87075 CULTR BACTERIA EXCEPT BLOOD: CPT

## 2023-12-25 PROCEDURE — 80048 BASIC METABOLIC PNL TOTAL CA: CPT

## 2023-12-25 PROCEDURE — 84132 ASSAY OF SERUM POTASSIUM: CPT

## 2023-12-25 PROCEDURE — S2900: CPT

## 2023-12-25 PROCEDURE — 87070 CULTURE OTHR SPECIMN AEROBIC: CPT

## 2023-12-25 PROCEDURE — 85610 PROTHROMBIN TIME: CPT

## 2023-12-25 PROCEDURE — 83036 HEMOGLOBIN GLYCOSYLATED A1C: CPT

## 2023-12-25 PROCEDURE — 85730 THROMBOPLASTIN TIME PARTIAL: CPT

## 2023-12-25 PROCEDURE — 80076 HEPATIC FUNCTION PANEL: CPT

## 2023-12-25 PROCEDURE — 87205 SMEAR GRAM STAIN: CPT

## 2023-12-25 PROCEDURE — 81001 URINALYSIS AUTO W/SCOPE: CPT

## 2023-12-25 PROCEDURE — 83735 ASSAY OF MAGNESIUM: CPT

## 2023-12-25 PROCEDURE — 88304 TISSUE EXAM BY PATHOLOGIST: CPT

## 2023-12-25 PROCEDURE — 85027 COMPLETE CBC AUTOMATED: CPT

## 2023-12-25 PROCEDURE — 82947 ASSAY GLUCOSE BLOOD QUANT: CPT

## 2023-12-25 PROCEDURE — 82962 GLUCOSE BLOOD TEST: CPT

## 2023-12-25 PROCEDURE — 82330 ASSAY OF CALCIUM: CPT

## 2023-12-25 PROCEDURE — 80053 COMPREHEN METABOLIC PANEL: CPT

## 2023-12-25 PROCEDURE — 84295 ASSAY OF SERUM SODIUM: CPT

## 2023-12-25 PROCEDURE — 82803 BLOOD GASES ANY COMBINATION: CPT

## 2023-12-25 PROCEDURE — 36415 COLL VENOUS BLD VENIPUNCTURE: CPT

## 2023-12-25 PROCEDURE — 96374 THER/PROPH/DIAG INJ IV PUSH: CPT

## 2023-12-25 PROCEDURE — 82010 KETONE BODYS QUAN: CPT

## 2023-12-25 PROCEDURE — 86901 BLOOD TYPING SEROLOGIC RH(D): CPT

## 2023-12-25 PROCEDURE — 99233 SBSQ HOSP IP/OBS HIGH 50: CPT

## 2023-12-25 PROCEDURE — 99285 EMERGENCY DEPT VISIT HI MDM: CPT

## 2023-12-25 PROCEDURE — 85018 HEMOGLOBIN: CPT

## 2023-12-25 PROCEDURE — 84100 ASSAY OF PHOSPHORUS: CPT

## 2023-12-25 PROCEDURE — 82248 BILIRUBIN DIRECT: CPT

## 2023-12-25 PROCEDURE — 76705 ECHO EXAM OF ABDOMEN: CPT

## 2023-12-25 PROCEDURE — 83605 ASSAY OF LACTIC ACID: CPT

## 2023-12-25 PROCEDURE — 86850 RBC ANTIBODY SCREEN: CPT

## 2023-12-25 RX ORDER — SODIUM,POTASSIUM PHOSPHATES 278-250MG
2 POWDER IN PACKET (EA) ORAL ONCE
Refills: 0 | Status: COMPLETED | OUTPATIENT
Start: 2023-12-25 | End: 2023-12-25

## 2023-12-25 RX ADMIN — Medication 650 MILLIGRAM(S): at 10:52

## 2023-12-25 RX ADMIN — Medication 650 MILLIGRAM(S): at 11:50

## 2023-12-25 RX ADMIN — Medication 2 TABLET(S): at 09:48

## 2023-12-25 RX ADMIN — Medication 650 MILLIGRAM(S): at 04:05

## 2023-12-25 RX ADMIN — Medication 650 MILLIGRAM(S): at 03:02

## 2023-12-25 RX ADMIN — PIPERACILLIN AND TAZOBACTAM 25 GRAM(S): 4; .5 INJECTION, POWDER, LYOPHILIZED, FOR SOLUTION INTRAVENOUS at 05:15

## 2023-12-25 RX ADMIN — HEPARIN SODIUM 5000 UNIT(S): 5000 INJECTION INTRAVENOUS; SUBCUTANEOUS at 07:53

## 2023-12-25 RX ADMIN — CHLORHEXIDINE GLUCONATE 1 APPLICATION(S): 213 SOLUTION TOPICAL at 05:27

## 2023-12-25 NOTE — DISCHARGE NOTE PROVIDER - NSDCFUADDINST_GEN_ALL_CORE_FT
Please follow instructions given by Dr. Fisher.     General Discharge Instructions:  Please resume all regular home medications unless specifically advised not to take a particular medication. Also, please take any new medications as prescribed.  Please get plenty of rest, continue to ambulate several times per day, and drink adequate amounts of fluids. Avoid lifting weights greater than 5-10 lbs until you follow-up with your surgeon, who will instruct you further regarding activity restrictions.  Avoid driving or operating heavy machinery while taking pain medications.  Please follow-up with your surgeon and Primary Care Provider (PCP) as advised.  Incision Care:  *Please call your doctor or nurse practitioner if you have increased pain, swelling, redness, or drainage from the incision site.  *Avoid swimming and baths until your follow-up appointment.  *You may shower, and wash surgical incisions with a mild soap and warm water. Gently pat the area dry.  *You have steri-strips, they will fall off on their own. Please remove any remaining strips 7-10 days after surgery.  *You incisions are covered with Derma-Bond, when in the shower, do not scrub or pat dry.     Warning Signs to monitor:  Please call your doctor or nurse practitioner if you experience the following:  *You experience new chest pain, pressure, squeezing or tightness.  *New or worsening cough, shortness of breath, or wheeze.  *If you are vomiting and cannot keep down fluids or your medications.  *You are getting dehydrated due to continued vomiting, diarrhea, or other reasons. Signs of dehydration include dry mouth, rapid heartbeat, or feeling dizzy or faint when standing.  *You see blood or dark/black material when you vomit or have a bowel movement.  *You experience burning when you urinate, have blood in your urine, or experience a discharge.  *Your pain is not improving within 8-12 hours or is not gone within 24 hours. Call or return immediately if your pain is getting worse, changes location, or moves to your chest or back.  *You have shaking chills, or fever greater than 101.5 degrees Fahrenheit or 38 degrees Celsius.  *Any change in your symptoms, or any new symptoms that concern you.    If unable to reach a medical professional please go to an emergency room.  Please follow instructions given by Dr. Fisher. Please follow up with PCP prior to restarting full regiment for Diabetes.     General Discharge Instructions:  Please resume all regular home medications unless specifically advised not to take a particular medication. Also, please take any new medications as prescribed.  Please get plenty of rest, continue to ambulate several times per day, and drink adequate amounts of fluids. Avoid lifting weights greater than 5-10 lbs until you follow-up with your surgeon, who will instruct you further regarding activity restrictions.  Avoid driving or operating heavy machinery while taking pain medications.  Please follow-up with your surgeon and Primary Care Provider (PCP) as advised.  Incision Care:  *Please call your doctor or nurse practitioner if you have increased pain, swelling, redness, or drainage from the incision site.  *Avoid swimming and baths until your follow-up appointment.  *You may shower, and wash surgical incisions with a mild soap and warm water. Gently pat the area dry.  *You have steri-strips, they will fall off on their own. Please remove any remaining strips 7-10 days after surgery.  *You incisions are covered with Derma-Bond, when in the shower, do not scrub or pat dry.     Warning Signs to monitor:  Please call your doctor or nurse practitioner if you experience the following:  *You experience new chest pain, pressure, squeezing or tightness.  *New or worsening cough, shortness of breath, or wheeze.  *If you are vomiting and cannot keep down fluids or your medications.  *You are getting dehydrated due to continued vomiting, diarrhea, or other reasons. Signs of dehydration include dry mouth, rapid heartbeat, or feeling dizzy or faint when standing.  *You see blood or dark/black material when you vomit or have a bowel movement.  *You experience burning when you urinate, have blood in your urine, or experience a discharge.  *Your pain is not improving within 8-12 hours or is not gone within 24 hours. Call or return immediately if your pain is getting worse, changes location, or moves to your chest or back.  *You have shaking chills, or fever greater than 101.5 degrees Fahrenheit or 38 degrees Celsius.  *Any change in your symptoms, or any new symptoms that concern you.    If unable to reach a medical professional please go to an emergency room.

## 2023-12-25 NOTE — PROGRESS NOTE ADULT - SUBJECTIVE AND OBJECTIVE BOX
INTERVAL HPI/OVERNIGHT EVENTS:  Patient was seen and examined at bedside. As per nurse and patient, no o/n events, patient resting comfortably. No complaints at this time. Drain removed this AM.  Patient denies: fever, chills, dizziness, weakness, HA, Changes in vision, CP, palpitations, SOB, cough, N/V/D/C, dysuria, changes in bowel movements, LE edema. ROS otherwise negative.    VITAL SIGNS:  T(F): 98.2 (12-25-23 @ 09:04)  HR: 72 (12-25-23 @ 08:10)  BP: 127/82 (12-25-23 @ 08:10)  RR: 17 (12-25-23 @ 08:10)  SpO2: 99% (12-25-23 @ 08:10)  Wt(kg): --    PHYSICAL EXAM:    Constitutional: WDWN, NAD  HEENT: PERRL, EOMI, sclera non-icteric, neck supple, trachea midline, no masses, no JVD, MMM, good dentition  Respiratory: CTA b/l, good air entry b/l, no wheezing, no rhonchi, no rales, without accessory muscle use and no intercostal retractions  Cardiovascular: RRR, normal S1S2, no M/R/G  Gastrointestinal: soft, NTND, no masses palpable, BS normal  Extremities: Warm, well perfused, pulses equal bilateral upper and lower extremities, no edema, no clubbing  Neurological: AAOx3, CN Grossly intact  Skin: Normal temperature, warm, dry    MEDICATIONS  (STANDING):  chlorhexidine 2% Cloths 1 Application(s) Topical <User Schedule>  dextrose 5%. 1000 milliLiter(s) (50 mL/Hr) IV Continuous <Continuous>  dextrose 5%. 1000 milliLiter(s) (100 mL/Hr) IV Continuous <Continuous>  dextrose 50% Injectable 25 Gram(s) IV Push once  dextrose 50% Injectable 25 Gram(s) IV Push once  dextrose 50% Injectable 12.5 Gram(s) IV Push once  dextrose 50% Injectable 50 milliLiter(s) IV Push every 15 minutes  glucagon  Injectable 1 milliGRAM(s) IntraMuscular once  heparin   Injectable 5000 Unit(s) SubCutaneous every 8 hours  insulin lispro (ADMELOG) corrective regimen sliding scale   SubCutaneous three times a day before meals  piperacillin/tazobactam IVPB.. 3.375 Gram(s) IV Intermittent every 8 hours    MEDICATIONS  (PRN):  acetaminophen     Tablet .. 650 milliGRAM(s) Oral every 6 hours PRN Mild Pain (1 - 3), Moderate Pain (4 - 6)  dextrose Oral Gel 15 Gram(s) Oral once PRN Blood Glucose LESS THAN 70 milliGRAM(s)/deciliter  ondansetron Injectable 4 milliGRAM(s) IV Push every 6 hours PRN Nausea  oxyCODONE    IR 5 milliGRAM(s) Oral every 6 hours PRN Severe Pain (7 - 10)      Allergies    No Known Allergies    Intolerances        LABS:                        11.6   5.66  )-----------( 180      ( 25 Dec 2023 05:26 )             35.1     12-25    139  |  104  |  10  ----------------------------<  121<H>  3.7   |  28  |  0.77    Ca    8.8      25 Dec 2023 05:26  Phos  2.6     12-25  Mg     1.9     12-25    TPro  6.5  /  Alb  3.2<L>  /  TBili  0.9  /  DBili  <0.2  /  AST  27  /  ALT  42  /  AlkPhos  46  12-25      Urinalysis Basic - ( 25 Dec 2023 05:26 )    Color: x / Appearance: x / SG: x / pH: x  Gluc: 121 mg/dL / Ketone: x  / Bili: x / Urobili: x   Blood: x / Protein: x / Nitrite: x   Leuk Esterase: x / RBC: x / WBC x   Sq Epi: x / Non Sq Epi: x / Bacteria: x        RADIOLOGY & ADDITIONAL TESTS:  Reviewed

## 2023-12-25 NOTE — PATIENT PROFILE ADULT - DO YOU EVER NEED HELP READING HOSPITAL MATERIALS?
Problem: Patient Care Overview  Goal: Plan of Care Review  Outcome: Ongoing (interventions implemented as appropriate)   07/05/19 3392   Coping/Psychosocial   Plan of Care Reviewed With patient   Plan of Care Review   Progress improving   OTHER   Outcome Summary In MRI tnow. De Dios remains. To start on Flomax. Pain well controlled. Arm remains in sling          no

## 2023-12-25 NOTE — DISCHARGE NOTE NURSING/CASE MANAGEMENT/SOCIAL WORK - PATIENT PORTAL LINK FT
You can access the FollowMyHealth Patient Portal offered by [facility] by registering at the following website: [email or web address]. By joining [employer]’s FollowMyHealth portal, you will also be able to view your health information using other applications (apps) compatible with our system. You can access the FollowMyHealth Patient Portal offered by [facility] by registering at the following website: [email or web address]. By joining [employer]’s FollowMyHealth portal, you will also be able to view your health information using other applications (apps) compatible with our system.

## 2023-12-25 NOTE — PATIENT PROFILE ADULT - FOOD INSECURITY
Delivery Information:        Information for the patient's :  Stan Paul [[id number]]   Birth Information  YOB: 2020  Time of birth: 7:10 PM  Delivering clinician: Mckenna Campos  Sex: female  Delivery type: Vaginal, Spontaneous  Presentation: Vertex  Gestational Age: 34w0d    APGARS:    One Minute: 8   Five Minutes: 8    Ten Minutes:            Vaginal Delivery Procedure    Cervical Ripening Type: arizmendi balloon  Pitocin: Used for induction, Used for augmentation and Used postpartum     Anesthesia    Method: Epidural    NICU Staff Present at Delivery?: Yes    Labor Complications: None and Pre-Eclampsia    Shoulder Dystocia Details:  Shoulder Dystocia Present: No      Cord Information    Vessels:  3 Vessels    Complications: None    Delayed cord clamping? Yes    Blood gases sent? Yes    Stem cells collected by MD? No       Placenta Information  Delivered: 2020  7:17 PM   Removal: Spontaneous       Lacerations  Episiotomy: None    Laceration Type Repaired?   Perineal: None      Periurethral: left  Yes    Labial:       Sulcus:       Vaginal:       Cervical:           Repair suture: 3-0 Vicryl    Total estimated blood loss (mL): 250        Uterus Explored: No    Antibiotics Received During Labor: Ampicillin  Antibiotic Reason (If Yes): Prophylaxis    Priscilla proceeded to complete. The head presented in KATE position.  No nuchal cord. Shoulders delivered atraumatically and body followed.  Bulb suction was used to suction the nose and mouth. Cord clamping was delayed >30 seconds. Infant was brought to the warmer to the waiting pediatricians. Placenta delivered spontaneously and intact. There were bilateral periurethral lacerations, and the left was repaired with 3-0 vicryl. Mom and baby stable.     Vaginal Counts:    Accurate Final Count? Yes    Review the Delivery Report for details     Attending: Dr. Campos  Resident: Laura Boyle PGY-3   no

## 2023-12-25 NOTE — DISCHARGE NOTE PROVIDER - NSDCFUSCHEDAPPT_GEN_ALL_CORE_FT
[facility]  [address]  Scheduled Appointment: 12/26/2023     [facility]  [address]  Scheduled Appointment: 12/26/2023

## 2023-12-25 NOTE — DISCHARGE NOTE PROVIDER - NSDCCPTREATMENT_GEN_ALL_CORE_FT
PRINCIPAL PROCEDURE  Procedure: Robot-assisted laparoscopy with cholecystectomy  Findings and Treatment:       SECONDARY PROCEDURE  Procedure: Repair, hernia, umbilical, with lipectomy  Findings and Treatment:

## 2023-12-25 NOTE — DISCHARGE NOTE PROVIDER - NSDCCPCAREPLAN_GEN_ALL_CORE_FT
PRINCIPAL DISCHARGE DIAGNOSIS  Diagnosis: Cholecystitis  Assessment and Plan of Treatment:       SECONDARY DISCHARGE DIAGNOSES  Diagnosis: DKA (diabetic ketoacidosis)  Assessment and Plan of Treatment:

## 2023-12-25 NOTE — DISCHARGE NOTE PROVIDER - CARE PROVIDER_API CALL
Debora Fisher   Surgery  [address], [address]  [place], [place] [address]  Phone: [phone]  Fax: [phone]  Follow Up Time: 1 week   Debora Fisher   Surgery  [address], [address]  [place], [place] [address]  Phone: [phone]  Fax: [phone]  Follow Up Time: 1 week

## 2023-12-25 NOTE — PROGRESS NOTE ADULT - ATTENDING COMMENTS
Abdomen distended, appropriately tender, BS-, Flatus-, BM-, tolerating clear liquid diet, wounds dry, clean, intact, AIDEN serous, IV ABX, DVT prophylaxis, Spirometry, F/U LABS, VS, OOB to ambulate.
Abdomen distended, appropriately tender, BS+,  Flatus+, BM-, tolerating clear liquid diet, advance to full liquid diet,  wounds dry, clean, intact, AIDEN serous, IV ABX, DVT prophylaxis, Spirometry, F/U LABS, VS, OOB to ambulate.
Abdomen soft, BS+,  Flatus+, BM+,  tolerating diet, wounds dry, clean, intact, AIDEN serous, D/C AIDEN, IV ABX, DVT prophylaxis, Spirometry, F/U LABS, VS, OOB to ambulate. D/C home with F/U in the office.
DM2 s/p lap migue for gangrenous gallbladder with ketoacidosis  physical as above  euglycemic DKA vs starvation ketosis  now resolved  clear liwuid diet  given NPH 10 units this AM, can give 15 lantus tonight, consider endocrine or medicine consult  continue zosyn

## 2023-12-25 NOTE — PATIENT PROFILE ADULT - DO YOU FEEL THREATENED BY OTHERS?
2019     Amelia Kennedy (:  1934) is a 82 y.o. female, Jaci Javier today as a follow-up from [facility]. She was found to have a UTI gross diarrhea and edema of the lower extremities. This was not CHF that she was placed on chlorthalidone for the dependent leg edema. For the hypokalemia this was corrected with oral potassium which she has completed. She is no longer having any diarrhea which the Flagyl had resolved. She knows that she cannot hear but cannot afford the hearing aids that have been recommended. She is requiring refills of her medications today. Review of Systems    Prior to Visit Medications    Medication Sig Taking?  Authorizing Provider   amLODIPine (NORVASC) 2.5 MG tablet Take 1 tablet by mouth daily Yes Misty Molina DO   pravastatin (PRAVACHOL) 40 MG tablet Take 1 tablet by mouth daily Yes Misty Molina DO   chlorthalidone (HYGROTON) 25 MG tablet Take 1 tablet by mouth daily Yes JAMES Barreto - CNP   levothyroxine (SYNTHROID) 112 MCG tablet Take 112 mcg by mouth daily  Yes Historical Provider, MD   NITROSTAT 0.4 MG SL tablet Place 0.4 mg under the tongue every 5 minutes as needed  Yes Historical Provider, MD   aspirin EC 81 MG EC tablet Take 81 mg by mouth daily Yes Historical Provider, MD      Allergies   Allergen Reactions    Cortizone-10  [Hydrocortisone] Hives    Diclofenac-Misoprostol     Sulfa Antibiotics        Past Medical History:   Diagnosis Date    Arthritis     Chest pain     HTN (hypertension)     Hyperlipidemia      Past Surgical History:   Procedure Laterality Date    GALLBLADDER SURGERY      HYSTERECTOMY      THYROIDECTOMY        Social History     Tobacco Use    Smoking status: Former Smoker    Smokeless tobacco: Never Used   Substance Use Topics    Alcohol use: Yes        Vitals:    19 0950   BP: (!) 156/68   Pulse: 84   Temp: 97.1 °F (36.2 °C)   SpO2: 97%     Estimated body mass index is 22.71 kg/m² as
no

## 2023-12-25 NOTE — DISCHARGE NOTE NURSING/CASE MANAGEMENT/SOCIAL WORK - NSDCPEFALRISK_GEN_ALL_CORE
For information on Fall & Injury Prevention, visit: [email or web address] OR  [email or web address] OR  https://www.cdc.gov/steadi/patient.html For information on Fall & Injury Prevention, visit: [email or web address] OR  [email or web address] OR  https://www.cdc.gov/steadi/patient.html

## 2023-12-25 NOTE — PROGRESS NOTE ADULT - SUBJECTIVE AND OBJECTIVE BOX
Overnight events:       POD#    SUBJECTIVE:      MEDICATIONS  (STANDING):  chlorhexidine 2% Cloths 1 Application(s) Topical <User Schedule>  dextrose 5%. 1000 milliLiter(s) (50 mL/Hr) IV Continuous <Continuous>  dextrose 5%. 1000 milliLiter(s) (100 mL/Hr) IV Continuous <Continuous>  dextrose 50% Injectable 25 Gram(s) IV Push once  dextrose 50% Injectable 12.5 Gram(s) IV Push once  dextrose 50% Injectable 25 Gram(s) IV Push once  dextrose 50% Injectable 50 milliLiter(s) IV Push every 15 minutes  glucagon  Injectable 1 milliGRAM(s) IntraMuscular once  heparin   Injectable 5000 Unit(s) SubCutaneous every 8 hours  insulin lispro (ADMELOG) corrective regimen sliding scale   SubCutaneous three times a day before meals  piperacillin/tazobactam IVPB.. 3.375 Gram(s) IV Intermittent every 8 hours    MEDICATIONS  (PRN):  acetaminophen     Tablet .. 650 milliGRAM(s) Oral every 6 hours PRN Mild Pain (1 - 3), Moderate Pain (4 - 6)  dextrose Oral Gel 15 Gram(s) Oral once PRN Blood Glucose LESS THAN 70 milliGRAM(s)/deciliter  ondansetron Injectable 4 milliGRAM(s) IV Push every 6 hours PRN Nausea  oxyCODONE    IR 5 milliGRAM(s) Oral every 6 hours PRN Severe Pain (7 - 10)      Vital Signs Last 24 Hrs  T(C): 36.3 (25 Dec 2023 05:05), Max: 37.2 (24 Dec 2023 13:31)  T(F): 97.4 (25 Dec 2023 05:05), Max: 98.9 (24 Dec 2023 13:31)  HR: 80 (25 Dec 2023 04:50) (78 - 90)  BP: 119/75 (25 Dec 2023 04:50) (108/71 - 126/77)  BP(mean): 90 (25 Dec 2023 04:50) (83 - 96)  RR: 17 (25 Dec 2023 04:50) (17 - 19)  SpO2: 99% (25 Dec 2023 04:50) (98% - 99%)    Parameters below as of 25 Dec 2023 04:50  Patient On (Oxygen Delivery Method): room air        Physical Exam:  General: NAD, resting comfortably in bed  Pulmonary: Nonlabored breathing, no respiratory distress  Cardiovascular: NSR  Abdominal: soft, NT/ND  Extremities: WWP, normal strength  Neuro: A/O x 3, CNs II-XII grossly intact, no focal deficits, normal motor/sensation  Pulses: palpable distal pulses    I&O's Summary    23 Dec 2023 07:01  -  24 Dec 2023 07:00  --------------------------------------------------------  IN: 824.5 mL / OUT: 2748 mL / NET: -1923.5 mL    24 Dec 2023 07:01  -  25 Dec 2023 06:02  --------------------------------------------------------  IN: 390 mL / OUT: 750 mL / NET: -360 mL        LABS:                        11.6   5.66  )-----------( 180      ( 25 Dec 2023 05:26 )             35.1     12-25    139  |  104  |  10  ----------------------------<  121<H>  3.7   |  28  |  0.77    Ca    8.8      25 Dec 2023 05:26  Phos  2.6     12-25  Mg     1.9     12-25    TPro  6.5  /  Alb  3.2<L>  /  TBili  0.9  /  DBili  <0.2  /  AST  27  /  ALT  42  /  AlkPhos  46  12-25      Urinalysis Basic - ( 25 Dec 2023 05:26 )    Color: x / Appearance: x / SG: x / pH: x  Gluc: 121 mg/dL / Ketone: x  / Bili: x / Urobili: x   Blood: x / Protein: x / Nitrite: x   Leuk Esterase: x / RBC: x / WBC x   Sq Epi: x / Non Sq Epi: x / Bacteria: x      CAPILLARY BLOOD GLUCOSE      POCT Blood Glucose.: 137 mg/dL (24 Dec 2023 16:46)  POCT Blood Glucose.: 123 mg/dL (24 Dec 2023 11:21)    LIVER FUNCTIONS - ( 25 Dec 2023 05:26 )  Alb: 3.2 g/dL / Pro: 6.5 g/dL / ALK PHOS: 46 U/L / ALT: 42 U/L / AST: 27 U/L / GGT: x             RADIOLOGY & ADDITIONAL STUDIES:   Overnight events: No acute overnight events.       POD#3 lap migue    SUBJECTIVE: Patient seen at bedside with chief resident. Patient still endorses incisional tenderness. He endorses 3 BM and passing flatus. He denies nausea or vomiting.       MEDICATIONS  (STANDING):  chlorhexidine 2% Cloths 1 Application(s) Topical <User Schedule>  dextrose 5%. 1000 milliLiter(s) (50 mL/Hr) IV Continuous <Continuous>  dextrose 5%. 1000 milliLiter(s) (100 mL/Hr) IV Continuous <Continuous>  dextrose 50% Injectable 25 Gram(s) IV Push once  dextrose 50% Injectable 12.5 Gram(s) IV Push once  dextrose 50% Injectable 25 Gram(s) IV Push once  dextrose 50% Injectable 50 milliLiter(s) IV Push every 15 minutes  glucagon  Injectable 1 milliGRAM(s) IntraMuscular once  heparin   Injectable 5000 Unit(s) SubCutaneous every 8 hours  insulin lispro (ADMELOG) corrective regimen sliding scale   SubCutaneous three times a day before meals  piperacillin/tazobactam IVPB.. 3.375 Gram(s) IV Intermittent every 8 hours    MEDICATIONS  (PRN):  acetaminophen     Tablet .. 650 milliGRAM(s) Oral every 6 hours PRN Mild Pain (1 - 3), Moderate Pain (4 - 6)  dextrose Oral Gel 15 Gram(s) Oral once PRN Blood Glucose LESS THAN 70 milliGRAM(s)/deciliter  ondansetron Injectable 4 milliGRAM(s) IV Push every 6 hours PRN Nausea  oxyCODONE    IR 5 milliGRAM(s) Oral every 6 hours PRN Severe Pain (7 - 10)      Vital Signs Last 24 Hrs  T(C): 36.3 (25 Dec 2023 05:05), Max: 37.2 (24 Dec 2023 13:31)  T(F): 97.4 (25 Dec 2023 05:05), Max: 98.9 (24 Dec 2023 13:31)  HR: 80 (25 Dec 2023 04:50) (78 - 90)  BP: 119/75 (25 Dec 2023 04:50) (108/71 - 126/77)  BP(mean): 90 (25 Dec 2023 04:50) (83 - 96)  RR: 17 (25 Dec 2023 04:50) (17 - 19)  SpO2: 99% (25 Dec 2023 04:50) (98% - 99%)    Parameters below as of 25 Dec 2023 04:50  Patient On (Oxygen Delivery Method): room air        Physical Exam:  General: NAD, resting comfortably in bed  Pulmonary: Nonlabored breathing, no respiratory distress  Cardiovascular: NSR  Abdominal: soft, ND, Appropriate incisional tenderness Incision clean, dry, and intact.   Extremities: WWP, normal strength  Neuro: A/O x 3, CNs II-XII grossly intact,   I&O's Summary    23 Dec 2023 07:01  -  24 Dec 2023 07:00  --------------------------------------------------------  IN: 824.5 mL / OUT: 2748 mL / NET: -1923.5 mL    24 Dec 2023 07:01  -  25 Dec 2023 06:02  --------------------------------------------------------  IN: 390 mL / OUT: 750 mL / NET: -360 mL        LABS:                        11.6   5.66  )-----------( 180      ( 25 Dec 2023 05:26 )             35.1     12-25    139  |  104  |  10  ----------------------------<  121<H>  3.7   |  28  |  0.77    Ca    8.8      25 Dec 2023 05:26  Phos  2.6     12-25  Mg     1.9     12-25    TPro  6.5  /  Alb  3.2<L>  /  TBili  0.9  /  DBili  <0.2  /  AST  27  /  ALT  42  /  AlkPhos  46  12-25      Urinalysis Basic - ( 25 Dec 2023 05:26 )    Color: x / Appearance: x / SG: x / pH: x  Gluc: 121 mg/dL / Ketone: x  / Bili: x / Urobili: x   Blood: x / Protein: x / Nitrite: x   Leuk Esterase: x / RBC: x / WBC x   Sq Epi: x / Non Sq Epi: x / Bacteria: x      CAPILLARY BLOOD GLUCOSE      POCT Blood Glucose.: 137 mg/dL (24 Dec 2023 16:46)  POCT Blood Glucose.: 123 mg/dL (24 Dec 2023 11:21)    LIVER FUNCTIONS - ( 25 Dec 2023 05:26 )  Alb: 3.2 g/dL / Pro: 6.5 g/dL / ALK PHOS: 46 U/L / ALT: 42 U/L / AST: 27 U/L / GGT: x             RADIOLOGY & ADDITIONAL STUDIES:

## 2023-12-25 NOTE — DISCHARGE NOTE PROVIDER - HOSPITAL COURSE
43M with PMHx of DMII and no PSHx who presents to [facility] for evaluation of RUQ abdominal pain. Admitted for gangrenous cholecystitis now s/p lap migue (12/22). Course c/b euglycemic DKA, in SICU for insulin gtt. SDU 12/24. On day of discharge patient was stable. 43M with PMHx of DMII and no PSHx who presents to [facility] for evaluation of RUQ abdominal pain. Admitted for gangrenous cholecystitis now s/p lap migue (12/22). Course c/b euglycemic DKA, in SICU for insulin gtt. SDU 12/24. On day of discharge patient was stable.

## 2023-12-25 NOTE — PROGRESS NOTE ADULT - ASSESSMENT
43M with PMHx of DMII and no PSHx who presents to [facility] for evaluation of RUQ abdominal pain. Admitted for gangrenous cholecystitis now s/p lap migue (12/22). Course c/b euglycemic DKA, in SICU for insulin gtt. SDU 12/24    Regular;low fat  Pain and nausea prn  SSI/Holding Metformin and Canagliflozin.   Zosyn (12/22--)   AIDEN x1 will be d/c on discharge  SQH/SCDs/OOB  d/c today   43M with PMHx of DMII and no PSHx who presents to [facility] for evaluation of RUQ abdominal pain. Admitted for gangrenous cholecystitis now s/p lap migue (12/22). Course c/b euglycemic DKA, in SICU for insulin gtt. SDU 12/24    Regular;low fat  Pain and nausea prn  SSI/Holding Metformin and Canagliflozin.   Zosyn (12/22--)   AIDEN x1 will be d/c on discharge  SQH/SCDs/OOB  d/c today

## 2023-12-25 NOTE — DISCHARGE NOTE PROVIDER - NSDCMRMEDTOKEN_GEN_ALL_CORE_FT
invokana:   MetFORMIN (Eqv-Fortamet) 1000 mg oral tablet, extended release: 1 tab(s) orally 2 times a day   amoxicillin-clavulanate 875 mg-125 mg oral tablet: 875 milligram(s) orally every 12 hours  invokana:   MetFORMIN (Eqv-Fortamet) 1000 mg oral tablet, extended release: 1 tab(s) orally 2 times a day

## 2023-12-25 NOTE — PATIENT PROFILE ADULT - FALL HARM RISK - HARM RISK INTERVENTIONS
Assistance with ambulation/Assistance OOB with selected safe patient handling equipment/Communicate Risk of Fall with Harm to all staff/Monitor gait and stability/Reinforce activity limits and safety measures with patient and family/Sit up slowly, dangle for a short time, stand at bedside before walking/Tailored Fall Risk Interventions/Toileting schedule using arm’s reach rule for commode and bathroom/Visual Cue: Yellow wristband and red socks/Bed in lowest position, wheels locked, appropriate side rails in place/Call bell, personal items and telephone in reach/Instruct patient to call for assistance before getting out of bed or chair/Non-slip footwear when patient is out of bed/Justice to call system/Physically safe environment - no spills, clutter or unnecessary equipment/Purposeful Proactive Rounding/Room/bathroom lighting operational, light cord in reach Assistance with ambulation/Assistance OOB with selected safe patient handling equipment/Communicate Risk of Fall with Harm to all staff/Monitor gait and stability/Reinforce activity limits and safety measures with patient and family/Sit up slowly, dangle for a short time, stand at bedside before walking/Tailored Fall Risk Interventions/Toileting schedule using arm’s reach rule for commode and bathroom/Visual Cue: Yellow wristband and red socks/Bed in lowest position, wheels locked, appropriate side rails in place/Call bell, personal items and telephone in reach/Instruct patient to call for assistance before getting out of bed or chair/Non-slip footwear when patient is out of bed/[place] to call system/Physically safe environment - no spills, clutter or unnecessary equipment/Purposeful Proactive Rounding/Room/bathroom lighting operational, light cord in reach

## 2023-12-25 NOTE — PROGRESS NOTE ADULT - ASSESSMENT
·	43M PMH DM2 on metformin, Invokana sent in by his GI for acute onset RUQ pain, admitted for acute migue now 12/22 with lysis of adhesions in RUQ, gengrenous-appearing GB vs abscess, drain left in place. Course c/b euglycemic dka vs starvation ketosis--resolved. Doing well. DC today    #acute migue now 12/22 with lysis of adhesions in RUQ, gangrenous -appearing GB vs abscess  -C/w Zosyn .po transitions  -Usual postop care; is voiding, having BMs, walking      # euglycemic dka vs starvation ketosis--resolved.   At admission had BHB 2.6, pH 7.33, pCO 38, Glu 150-160. Lactate was 8. HCO3 21. Briefly treated with insulin gtt due to suspicion for euglycemia dka (was only able to hold invokana one day). Suspect more sepsis/starvaton ketosis picture.  FSG were higher 12/23--likely 2/2 stress response. Tolerated clears, has not needed SS correction today, Hemoglobin A1c  8.6  -MISS today  -Can continue taking his metformin at discharge. Has a FSG monitor at home.   -Plans on taking Ozempic, no more invokana

## 2023-12-25 NOTE — PROGRESS NOTE ADULT - SUBJECTIVE AND OBJECTIVE BOX
INTERVAL HPI/OVERNIGHT EVENTS:   SURGERY ATTENDING    STATUS POST:      Robotic cholecystectomy, Lysis of adhesions, evacuation of Right Upper quadrant abscess, washout, Drainage.     POST OPERATIVE DAY #: 3    SUBJECTIVE:  Flatus: [x ] YES [ ] NO             Bowel Movement: [x ] YES [ ] NO  Pain (0-10):       2     Pain Control Adequate: [x ] YES [ ] NO  Nausea: [ ] YES [x ] NO            Vomiting: [ ] YES [x ] NO  Diarrhea: [ ] YES [x ] NO         Constipation: [ ] YES [x ] NO     Chest Pain: [ ] YES [x ] NO    SOB:  [ ] YES [x ] NO    MEDICATIONS  (STANDING):  chlorhexidine 2% Cloths 1 Application(s) Topical <User Schedule>  dextrose 5%. 1000 milliLiter(s) (50 mL/Hr) IV Continuous <Continuous>  dextrose 5%. 1000 milliLiter(s) (100 mL/Hr) IV Continuous <Continuous>  dextrose 50% Injectable 25 Gram(s) IV Push once  dextrose 50% Injectable 25 Gram(s) IV Push once  dextrose 50% Injectable 12.5 Gram(s) IV Push once  dextrose 50% Injectable 50 milliLiter(s) IV Push every 15 minutes  glucagon  Injectable 1 milliGRAM(s) IntraMuscular once  heparin   Injectable 5000 Unit(s) SubCutaneous every 8 hours  insulin lispro (ADMELOG) corrective regimen sliding scale   SubCutaneous three times a day before meals  piperacillin/tazobactam IVPB.. 3.375 Gram(s) IV Intermittent every 8 hours    MEDICATIONS  (PRN):  acetaminophen     Tablet .. 650 milliGRAM(s) Oral every 6 hours PRN Mild Pain (1 - 3), Moderate Pain (4 - 6)  dextrose Oral Gel 15 Gram(s) Oral once PRN Blood Glucose LESS THAN 70 milliGRAM(s)/deciliter  ondansetron Injectable 4 milliGRAM(s) IV Push every 6 hours PRN Nausea  oxyCODONE    IR 5 milliGRAM(s) Oral every 6 hours PRN Severe Pain (7 - 10)      Vital Signs Last 24 Hrs  T(C): 36.8 (25 Dec 2023 09:04), Max: 37.2 (24 Dec 2023 13:31)  T(F): 98.2 (25 Dec 2023 09:04), Max: 98.9 (24 Dec 2023 13:31)  HR: 72 (25 Dec 2023 08:10) (72 - 90)  BP: 127/82 (25 Dec 2023 08:10) (108/71 - 127/82)  BP(mean): 100 (25 Dec 2023 08:10) (84 - 100)  RR: 17 (25 Dec 2023 08:10) (17 - 19)  SpO2: 99% (25 Dec 2023 08:10) (99% - 99%)    Parameters below as of 25 Dec 2023 08:10  Patient On (Oxygen Delivery Method): room air        PHYSICAL EXAM:      Constitutional:    Eyes:    ENMT:    Neck:    Breasts:    Back:    Respiratory:    Cardiovascular:    Gastrointestinal:    Genitourinary:    Rectal:    Extremities:    Vascular:    Neurological:    Skin:    Lymph Nodes:    Musculoskeletal:    Psychiatric:        I&O's Detail    24 Dec 2023 07:01  -  25 Dec 2023 07:00  --------------------------------------------------------  IN:    Oral Fluid: 390 mL  Total IN: 390 mL    OUT:    Bulb (mL): 50 mL    Voided (mL): 700 mL  Total OUT: 750 mL    Total NET: -360 mL      25 Dec 2023 07:01  -  25 Dec 2023 10:00  --------------------------------------------------------  IN:  Total IN: 0 mL    OUT:    Bulb (mL): 0 mL  Total OUT: 0 mL    Total NET: 0 mL          LABS:                        11.6   5.66  )-----------( 180      ( 25 Dec 2023 05:26 )             35.1     12-25    139  |  104  |  10  ----------------------------<  121<H>  3.7   |  28  |  0.77    Ca    8.8      25 Dec 2023 05:26  Phos  2.6     12-25  Mg     1.9     12-25    TPro  6.5  /  Alb  3.2<L>  /  TBili  0.9  /  DBili  <0.2  /  AST  27  /  ALT  42  /  AlkPhos  46  12-25      Urinalysis Basic - ( 25 Dec 2023 05:26 )    Color: x / Appearance: x / SG: x / pH: x  Gluc: 121 mg/dL / Ketone: x  / Bili: x / Urobili: x   Blood: x / Protein: x / Nitrite: x   Leuk Esterase: x / RBC: x / WBC x   Sq Epi: x / Non Sq Epi: x / Bacteria: x        RADIOLOGY & ADDITIONAL STUDIES:

## 2023-12-26 ENCOUNTER — APPOINTMENT (OUTPATIENT)
Dept: ULTRASOUND IMAGING | Facility: HOSPITAL | Age: 43
End: 2023-12-26

## 2023-12-26 PROBLEM — E11.9 TYPE 2 DIABETES MELLITUS WITHOUT COMPLICATIONS: Chronic | Status: ACTIVE | Noted: 2023-12-22

## 2023-12-29 DIAGNOSIS — R10.9 UNSPECIFIED ABDOMINAL PAIN: ICD-10-CM

## 2023-12-29 DIAGNOSIS — K65.1 PERITONEAL ABSCESS: ICD-10-CM

## 2023-12-29 DIAGNOSIS — A41.9 SEPSIS, UNSPECIFIED ORGANISM: ICD-10-CM

## 2023-12-29 DIAGNOSIS — K82.A1 GANGRENE OF GALLBLADDER IN CHOLECYSTITIS: ICD-10-CM
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
## 2023-12-29 DIAGNOSIS — Z79.84 LONG TERM (CURRENT) USE OF ORAL HYPOGLYCEMIC DRUGS: ICD-10-CM
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
## 2023-12-29 DIAGNOSIS — K66.0 PERITONEAL ADHESIONS (POSTPROCEDURAL) (POSTINFECTION): ICD-10-CM

## 2023-12-29 DIAGNOSIS — E11.9 TYPE 2 DIABETES MELLITUS WITHOUT COMPLICATIONS: ICD-10-CM

## 2023-12-29 DIAGNOSIS — E11.10 TYPE 2 DIABETES MELLITUS WITH KETOACIDOSIS WITHOUT COMA: ICD-10-CM

## 2023-12-29 DIAGNOSIS — K91.62 INTRAOPERATIVE HEMORRHAGE AND HEMATOMA OF A DIGESTIVE SYSTEM ORGAN OR STRUCTURE COMPLICATING OTHER PROCEDURE: ICD-10-CM

## 2023-12-29 DIAGNOSIS — K42.9 UMBILICAL HERNIA WITHOUT OBSTRUCTION OR GANGRENE: ICD-10-CM

## 2023-12-29 DIAGNOSIS — K81.0 ACUTE CHOLECYSTITIS: ICD-10-CM

## 2023-12-30 LAB
CULTURE RESULTS: NO GROWTH — SIGNIFICANT CHANGE UP
CULTURE RESULTS: NO GROWTH — SIGNIFICANT CHANGE UP
SPECIMEN SOURCE: SIGNIFICANT CHANGE UP
SPECIMEN SOURCE: SIGNIFICANT CHANGE UP

## 2024-01-04 LAB
SURGICAL PATHOLOGY STUDY: SIGNIFICANT CHANGE UP
SURGICAL PATHOLOGY STUDY: SIGNIFICANT CHANGE UP

## 2024-07-17 ENCOUNTER — APPOINTMENT (OUTPATIENT)
Dept: UROLOGY | Facility: CLINIC | Age: 44
End: 2024-07-17
Payer: COMMERCIAL

## 2024-07-17 VITALS
TEMPERATURE: 98 F | OXYGEN SATURATION: 99 % | HEIGHT: 74 IN | HEART RATE: 93 BPM | DIASTOLIC BLOOD PRESSURE: 82 MMHG | WEIGHT: 188 LBS | SYSTOLIC BLOOD PRESSURE: 118 MMHG | BODY MASS INDEX: 24.13 KG/M2

## 2024-07-17 DIAGNOSIS — N48.1 BALANITIS: ICD-10-CM

## 2024-07-17 DIAGNOSIS — N47.1 PHIMOSIS: ICD-10-CM

## 2024-07-17 PROCEDURE — 99203 OFFICE O/P NEW LOW 30 MIN: CPT

## 2024-07-17 RX ORDER — CLOTRIMAZOLE AND BETAMETHASONE DIPROPIONATE 10; .5 MG/G; MG/G
1-0.05 CREAM TOPICAL TWICE DAILY
Qty: 1 | Refills: 2 | Status: ACTIVE | COMMUNITY
Start: 2024-07-17 | End: 1900-01-01

## 2024-07-19 ENCOUNTER — APPOINTMENT (OUTPATIENT)
Dept: UROLOGY | Facility: CLINIC | Age: 44
End: 2024-07-19

## 2024-08-14 ENCOUNTER — APPOINTMENT (OUTPATIENT)
Dept: UROLOGY | Facility: CLINIC | Age: 44
End: 2024-08-14
Payer: COMMERCIAL

## 2024-08-14 DIAGNOSIS — N48.1 BALANITIS: ICD-10-CM

## 2024-08-14 PROCEDURE — 99214 OFFICE O/P EST MOD 30 MIN: CPT

## 2024-08-14 NOTE — PHYSICAL EXAM
[General Appearance - Well Developed] : well developed [General Appearance - Well Nourished] : well nourished [Normal Appearance] : normal appearance [Well Groomed] : well groomed [] : no respiratory distress [Exaggerated Use Of Accessory Muscles For Inspiration] : no accessory muscle use [Scrotum] : the scrotum was normal [Affect] : the affect was normal [Oriented To Time, Place, And Person] : oriented to person, place, and time [Mood] : the mood was normal [de-identified] : Able to retract foreskin to about the mid glans level. The visible glans is pink without erythema or signs of balanitis.

## 2024-08-14 NOTE — PHYSICAL EXAM
[General Appearance - Well Developed] : well developed [General Appearance - Well Nourished] : well nourished [Normal Appearance] : normal appearance [Well Groomed] : well groomed [] : no respiratory distress [Exaggerated Use Of Accessory Muscles For Inspiration] : no accessory muscle use [Scrotum] : the scrotum was normal [Oriented To Time, Place, And Person] : oriented to person, place, and time [Affect] : the affect was normal [Mood] : the mood was normal [de-identified] : Able to retract foreskin to about the mid glans level. The visible glans is pink without erythema or signs of balanitis.

## 2024-08-14 NOTE — LETTER BODY
[Dear  ___] : Dear  [unfilled], [Courtesy Letter:] : I had the pleasure of seeing your patient, [unfilled], in my office today. [Please see my note below.] : Please see my note below. [Consult Closing:] : Thank you very much for allowing me to participate in the care of this patient.  If you have any questions, please do not hesitate to contact me. [FreeTextEntry3] : Best Regards,   Julieta Lloyd MD
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
## 2024-08-14 NOTE — ASSESSMENT
[FreeTextEntry1] : 44M with history of balanitis and phimosis s/p treatment with Lotrisone cream. Balanitis has resolved but phimosis remains. Pt is interested in circumcision. Discussed indications, benefits, risks of circumcision and recovery.   Will plan for circumcision when pts schedule permits, tentative plan for October

## (undated) DEVICE — TROCAR APPLIED MEDICAL KII BALLOON BLUNT TIP 12MM X 100MM

## (undated) DEVICE — D HELP - CLEARVIEW CLEARIFY SYSTEM

## (undated) DEVICE — GLV 7.5 PROTEXIS (WHITE)

## (undated) DEVICE — DRSG STERISTRIPS 0.5 X 4"

## (undated) DEVICE — SUT MAXON 0 30" HGU-46

## (undated) DEVICE — GLV 7.5 SENSICARE W ALOE

## (undated) DEVICE — TUBING STRYKER PNEUMOCLEAR HIGH FLOW

## (undated) DEVICE — XI TIP COVER

## (undated) DEVICE — SUT VICRYL 0 27" UR-6

## (undated) DEVICE — XI DRAPE COLUMN

## (undated) DEVICE — BLADE SCALPEL SAFETY #11 WITH PLASTIC GREEN HANDLE

## (undated) DEVICE — XI OBTURATOR OPTICAL BLADELESS 8MM

## (undated) DEVICE — XI DRAPE ARM

## (undated) DEVICE — WARMING BLANKET UPPER ADULT

## (undated) DEVICE — PREP CHLORAPREP HI-LITE ORANGE 26ML

## (undated) DEVICE — ELCTR GROUNDING PAD ADULT COVIDIEN

## (undated) DEVICE — SYR SLIP 10CC

## (undated) DEVICE — XI ENDOWRIST SUCTION IRRIGATOR 8MM

## (undated) DEVICE — PREP SCRUB BRUSH W CHG 4%

## (undated) DEVICE — DRAPE TOP SHEET 53" X 101"

## (undated) DEVICE — SUT MONOCRYL 4-0 27" PS-2 UNDYED

## (undated) DEVICE — PACK GENERAL LAPAROSCOPY

## (undated) DEVICE — GLV 7 PROTEXIS (WHITE)

## (undated) DEVICE — XI SEAL UNIV 5- 8 MM

## (undated) DEVICE — DRSG DERMABOND 0.7ML

## (undated) DEVICE — POSITIONER PINK PAD PIGAZZI SYSTEM FULL KIT

## (undated) DEVICE — TUBING STRYKER PNEUMOCLEAR SMOKE EVACUATION HIGH FLOW

## (undated) DEVICE — DRAPE 3/4 SHEET 52X76"

## (undated) DEVICE — ENDOCATCH 10MM SPECIMEN POUCH

## (undated) DEVICE — TIP METZENBAUM SCISSOR MONOPOLAR ENDOCUT (ORANGE)

## (undated) DEVICE — DRAPE 1/2 SHEET 40X57"

## (undated) DEVICE — FOLEY TRAY 16FR 5CC LF UMETER CLOSED